# Patient Record
Sex: MALE | Race: WHITE | NOT HISPANIC OR LATINO | Employment: OTHER | URBAN - METROPOLITAN AREA
[De-identification: names, ages, dates, MRNs, and addresses within clinical notes are randomized per-mention and may not be internally consistent; named-entity substitution may affect disease eponyms.]

---

## 2017-03-21 ENCOUNTER — OFFICE VISIT - RIVER FALLS (OUTPATIENT)
Dept: FAMILY MEDICINE | Facility: CLINIC | Age: 67
End: 2017-03-21

## 2017-04-06 ENCOUNTER — OFFICE VISIT - RIVER FALLS (OUTPATIENT)
Dept: FAMILY MEDICINE | Facility: CLINIC | Age: 67
End: 2017-04-06

## 2017-04-06 ASSESSMENT — MIFFLIN-ST. JEOR: SCORE: 1685.25

## 2018-04-19 ENCOUNTER — AMBULATORY - RIVER FALLS (OUTPATIENT)
Dept: FAMILY MEDICINE | Facility: CLINIC | Age: 68
End: 2018-04-19

## 2018-04-20 LAB
CHOLEST SERPL-MCNC: 166 MG/DL
CHOLEST/HDLC SERPL: 3.8 {RATIO}
CREAT SERPL-MCNC: 0.94 MG/DL (ref 0.7–1.25)
GLUCOSE BLD-MCNC: 89 MG/DL (ref 65–99)
HDLC SERPL-MCNC: 44 MG/DL
LDLC SERPL CALC-MCNC: 98 MG/DL
NONHDLC SERPL-MCNC: 122 MG/DL
PSA SERPL-MCNC: 0.3 NG/ML
TRIGL SERPL-MCNC: 143 MG/DL

## 2018-05-04 ENCOUNTER — OFFICE VISIT - RIVER FALLS (OUTPATIENT)
Dept: FAMILY MEDICINE | Facility: CLINIC | Age: 68
End: 2018-05-04

## 2018-05-04 ASSESSMENT — MIFFLIN-ST. JEOR: SCORE: 1739.68

## 2018-06-19 ENCOUNTER — OFFICE VISIT - RIVER FALLS (OUTPATIENT)
Dept: FAMILY MEDICINE | Facility: CLINIC | Age: 68
End: 2018-06-19

## 2018-06-19 ASSESSMENT — MIFFLIN-ST. JEOR: SCORE: 1739.68

## 2019-01-04 ENCOUNTER — OFFICE VISIT - RIVER FALLS (OUTPATIENT)
Dept: FAMILY MEDICINE | Facility: CLINIC | Age: 69
End: 2019-01-04

## 2019-01-04 ASSESSMENT — MIFFLIN-ST. JEOR: SCORE: 1730.61

## 2019-05-14 ENCOUNTER — AMBULATORY - RIVER FALLS (OUTPATIENT)
Dept: FAMILY MEDICINE | Facility: CLINIC | Age: 69
End: 2019-05-14

## 2019-05-15 ENCOUNTER — COMMUNICATION - RIVER FALLS (OUTPATIENT)
Dept: FAMILY MEDICINE | Facility: CLINIC | Age: 69
End: 2019-05-15

## 2019-05-15 LAB
BUN SERPL-MCNC: 17 MG/DL (ref 7–25)
BUN/CREAT RATIO - HISTORICAL: NORMAL (ref 6–22)
CALCIUM SERPL-MCNC: 9.2 MG/DL (ref 8.6–10.3)
CHLORIDE BLD-SCNC: 102 MMOL/L (ref 98–110)
CHOLEST SERPL-MCNC: 158 MG/DL
CHOLEST/HDLC SERPL: 3.3 {RATIO}
CO2 SERPL-SCNC: 27 MMOL/L (ref 20–32)
CREAT SERPL-MCNC: 0.93 MG/DL (ref 0.7–1.25)
EGFRCR SERPLBLD CKD-EPI 2021: 85 ML/MIN/1.73M2
GLUCOSE BLD-MCNC: 88 MG/DL (ref 65–99)
HDLC SERPL-MCNC: 48 MG/DL
LDLC SERPL CALC-MCNC: 89 MG/DL
NONHDLC SERPL-MCNC: 110 MG/DL
POTASSIUM BLD-SCNC: 4.1 MMOL/L (ref 3.5–5.3)
PSA SERPL-MCNC: 0.4 NG/ML
SODIUM SERPL-SCNC: 139 MMOL/L (ref 135–146)
TRIGL SERPL-MCNC: 113 MG/DL

## 2019-05-17 ENCOUNTER — OFFICE VISIT - RIVER FALLS (OUTPATIENT)
Dept: FAMILY MEDICINE | Facility: CLINIC | Age: 69
End: 2019-05-17

## 2019-10-22 ENCOUNTER — OFFICE VISIT - RIVER FALLS (OUTPATIENT)
Dept: FAMILY MEDICINE | Facility: CLINIC | Age: 69
End: 2019-10-22

## 2019-10-22 ASSESSMENT — MIFFLIN-ST. JEOR: SCORE: 1721.54

## 2020-05-04 ENCOUNTER — COMMUNICATION - RIVER FALLS (OUTPATIENT)
Dept: FAMILY MEDICINE | Facility: CLINIC | Age: 70
End: 2020-05-04

## 2020-07-22 ENCOUNTER — COMMUNICATION - RIVER FALLS (OUTPATIENT)
Dept: FAMILY MEDICINE | Facility: CLINIC | Age: 70
End: 2020-07-22

## 2020-08-13 ENCOUNTER — OFFICE VISIT - RIVER FALLS (OUTPATIENT)
Dept: FAMILY MEDICINE | Facility: CLINIC | Age: 70
End: 2020-08-13

## 2020-08-13 ENCOUNTER — TRANSFERRED RECORDS (OUTPATIENT)
Dept: MULTI SPECIALTY CLINIC | Facility: CLINIC | Age: 70
End: 2020-08-13

## 2020-08-13 ASSESSMENT — MIFFLIN-ST. JEOR: SCORE: 1730.61

## 2020-08-14 ENCOUNTER — COMMUNICATION - RIVER FALLS (OUTPATIENT)
Dept: FAMILY MEDICINE | Facility: CLINIC | Age: 70
End: 2020-08-14

## 2020-08-14 LAB
BUN SERPL-MCNC: 13 MG/DL (ref 7–25)
BUN/CREAT RATIO - HISTORICAL: ABNORMAL (ref 6–22)
CALCIUM SERPL-MCNC: 9 MG/DL (ref 8.6–10.3)
CHLORIDE BLD-SCNC: 103 MMOL/L (ref 98–110)
CHOLEST SERPL-MCNC: 157 MG/DL
CHOLEST/HDLC SERPL: 4.1 {RATIO}
CO2 SERPL-SCNC: 28 MMOL/L (ref 20–32)
CREAT SERPL-MCNC: 0.98 MG/DL (ref 0.7–1.25)
EGFRCR SERPLBLD CKD-EPI 2021: 79 ML/MIN/1.73M2
GLUCOSE BLD-MCNC: 101 MG/DL (ref 65–99)
HDLC SERPL-MCNC: 38 MG/DL
LDLC SERPL CALC-MCNC: 82 MG/DL
NONHDLC SERPL-MCNC: 119 MG/DL
POTASSIUM BLD-SCNC: 4.4 MMOL/L (ref 3.5–5.3)
PSA SERPL-MCNC: 0.3 NG/ML
SODIUM SERPL-SCNC: 139 MMOL/L (ref 135–146)
TRIGL SERPL-MCNC: 283 MG/DL

## 2020-11-12 ENCOUNTER — COMMUNICATION - RIVER FALLS (OUTPATIENT)
Dept: FAMILY MEDICINE | Facility: CLINIC | Age: 70
End: 2020-11-12

## 2021-08-19 ENCOUNTER — AMBULATORY - RIVER FALLS (OUTPATIENT)
Dept: FAMILY MEDICINE | Facility: CLINIC | Age: 71
End: 2021-08-19

## 2021-08-20 ENCOUNTER — COMMUNICATION - RIVER FALLS (OUTPATIENT)
Dept: FAMILY MEDICINE | Facility: CLINIC | Age: 71
End: 2021-08-20

## 2021-08-20 LAB
BUN SERPL-MCNC: 19 MG/DL (ref 7–25)
BUN/CREAT RATIO - HISTORICAL: NORMAL (ref 6–22)
CALCIUM SERPL-MCNC: 8.7 MG/DL (ref 8.6–10.3)
CHLORIDE BLD-SCNC: 107 MMOL/L (ref 98–110)
CHOLEST SERPL-MCNC: 154 MG/DL
CHOLEST/HDLC SERPL: 3 {RATIO}
CO2 SERPL-SCNC: 25 MMOL/L (ref 20–32)
CREAT SERPL-MCNC: 0.96 MG/DL (ref 0.7–1.25)
EGFRCR SERPLBLD CKD-EPI 2021: 80 ML/MIN/1.73M2
GLUCOSE BLD-MCNC: 89 MG/DL (ref 65–99)
HDLC SERPL-MCNC: 52 MG/DL
LDLC SERPL CALC-MCNC: 86 MG/DL
NONHDLC SERPL-MCNC: 102 MG/DL
POTASSIUM BLD-SCNC: 4.2 MMOL/L (ref 3.5–5.3)
PSA SERPL-MCNC: 0.3 NG/ML
SODIUM SERPL-SCNC: 141 MMOL/L (ref 135–146)
TRIGL SERPL-MCNC: 70 MG/DL

## 2021-08-26 ENCOUNTER — OFFICE VISIT - RIVER FALLS (OUTPATIENT)
Dept: FAMILY MEDICINE | Facility: CLINIC | Age: 71
End: 2021-08-26

## 2021-10-07 ENCOUNTER — AMBULATORY - RIVER FALLS (OUTPATIENT)
Dept: FAMILY MEDICINE | Facility: CLINIC | Age: 71
End: 2021-10-07

## 2022-01-11 ENCOUNTER — OFFICE VISIT - RIVER FALLS (OUTPATIENT)
Dept: FAMILY MEDICINE | Facility: CLINIC | Age: 72
End: 2022-01-11

## 2022-02-11 VITALS
BODY MASS INDEX: 29.12 KG/M2 | SYSTOLIC BLOOD PRESSURE: 142 MMHG | DIASTOLIC BLOOD PRESSURE: 74 MMHG | HEART RATE: 75 BPM | TEMPERATURE: 97.8 F | WEIGHT: 208.8 LBS | DIASTOLIC BLOOD PRESSURE: 92 MMHG | HEART RATE: 82 BPM | SYSTOLIC BLOOD PRESSURE: 137 MMHG

## 2022-02-11 VITALS
DIASTOLIC BLOOD PRESSURE: 64 MMHG | TEMPERATURE: 97.8 F | HEART RATE: 92 BPM | SYSTOLIC BLOOD PRESSURE: 124 MMHG | OXYGEN SATURATION: 98 % | BODY MASS INDEX: 28.98 KG/M2 | WEIGHT: 207.8 LBS

## 2022-02-11 VITALS
DIASTOLIC BLOOD PRESSURE: 80 MMHG | HEART RATE: 75 BPM | HEIGHT: 71 IN | WEIGHT: 198 LBS | SYSTOLIC BLOOD PRESSURE: 114 MMHG | SYSTOLIC BLOOD PRESSURE: 134 MMHG | DIASTOLIC BLOOD PRESSURE: 72 MMHG | BODY MASS INDEX: 27.72 KG/M2

## 2022-02-11 VITALS
SYSTOLIC BLOOD PRESSURE: 126 MMHG | DIASTOLIC BLOOD PRESSURE: 68 MMHG | WEIGHT: 210 LBS | WEIGHT: 210 LBS | BODY MASS INDEX: 29.4 KG/M2 | HEART RATE: 76 BPM | HEIGHT: 71 IN | BODY MASS INDEX: 29.4 KG/M2 | HEART RATE: 84 BPM | SYSTOLIC BLOOD PRESSURE: 128 MMHG | HEIGHT: 71 IN | DIASTOLIC BLOOD PRESSURE: 68 MMHG

## 2022-02-11 VITALS
HEIGHT: 71 IN | OXYGEN SATURATION: 97 % | BODY MASS INDEX: 29.12 KG/M2 | SYSTOLIC BLOOD PRESSURE: 124 MMHG | TEMPERATURE: 98.4 F | WEIGHT: 208 LBS | DIASTOLIC BLOOD PRESSURE: 66 MMHG | HEART RATE: 88 BPM

## 2022-02-11 VITALS
BODY MASS INDEX: 28.84 KG/M2 | TEMPERATURE: 97.8 F | HEART RATE: 60 BPM | RESPIRATION RATE: 16 BRPM | WEIGHT: 206 LBS | HEIGHT: 71 IN | DIASTOLIC BLOOD PRESSURE: 76 MMHG | SYSTOLIC BLOOD PRESSURE: 126 MMHG

## 2022-02-11 VITALS — SYSTOLIC BLOOD PRESSURE: 118 MMHG | DIASTOLIC BLOOD PRESSURE: 82 MMHG

## 2022-02-11 VITALS
DIASTOLIC BLOOD PRESSURE: 68 MMHG | WEIGHT: 208 LBS | HEART RATE: 86 BPM | SYSTOLIC BLOOD PRESSURE: 146 MMHG | BODY MASS INDEX: 29.12 KG/M2 | HEIGHT: 71 IN

## 2022-02-15 NOTE — LETTER
(Inserted Image. Unable to display)   May 15, 2019      SHANE MCKEON      [address] [place] [address]        Dear SHANE,    Thank you for selecting [facility] (previously [facility]) for your healthcare needs.  Below you will find the results of your recent tests done at our clinic.    Results are acceptable.      Result Name Current Result Previous Result Reference Range   Sodium Level (mmol/L)  139 5/14/2019  140 4/19/2018 135 - 146   Potassium Level (mmol/L)  4.1 5/14/2019  4.2 4/19/2018 3.5 - 5.3   Chloride Level (mmol/L)  102 5/14/2019  104 4/19/2018 98 - 110   CO2 Level (mmol/L)  27 5/14/2019  28 4/19/2018 20 - 32   Glucose Level (mg/dL)  88 5/14/2019  89 4/19/2018 65 - 99   BUN (mg/dL)  17 5/14/2019  18 4/19/2018 7 - 25   Creatinine Level (mg/dL)  0.93 5/14/2019  0.94 4/19/2018 0.70 - 1.25   eGFR (mL/min/1.73m2)  85 5/14/2019  85 4/19/2018 > OR = 60 -    Calcium Level (mg/dL)  9.2 5/14/2019  8.9 4/19/2018 8.6 - 10.3   Cholesterol (mg/dL)  158 5/14/2019  166 4/19/2018  - <200   Non-HDL Cholesterol  110 5/14/2019  122 4/19/2018  - <130   HDL (mg/dL)  48 5/14/2019  44 4/19/2018 >40 -    Cholesterol/HDL Ratio  3.3 5/14/2019  3.8 4/19/2018  - <5.0   LDL  89 5/14/2019  98 4/19/2018    Triglyceride (mg/dL)  113 5/14/2019  143 4/19/2018  - <150   PSA (ng/mL)  0.4 5/14/2019  0.3 4/19/2018  - < OR = 4.0       Please contact me or my assistant at [phone] if you have any questions.     Sincerely,        Alexi Troy MD

## 2022-02-15 NOTE — NURSING NOTE
Medicare Visit Entered On:  8/13/2020 3:59 PM CDT    Performed On:  8/13/2020 3:52 PM CDT by Toña Muhammad               Summary   Chief Complaint :   AWV.   Weight Measured :   208 lb(Converted to: 208 lb 0 oz, 94.35 kg)    Height Measured :   71 in(Converted to: 5 ft 11 in, 180.34 cm)    Body Mass Index :   29.01 kg/m2 (HI)    Body Surface Area :   2.17 m2   Systolic Blood Pressure :   124 mmHg   Diastolic Blood Pressure :   66 mmHg   Mean Arterial Pressure :   85 mmHg   Peripheral Pulse Rate :   88 bpm   BP Site :   Right arm   BP Method :   Manual   HR Method :   Electronic   Temperature Tympanic :   98.4 DegF(Converted to: 36.9 DegC)    Oxygen Saturation :   97 %   Toña Muhammad - 8/13/2020 3:52 PM CDT   Health Status   Allergies Verified? :   Yes   Medication History Verified? :   Yes   Medical History Verified? :   Yes   Pre-Visit Planning Status :   Completed   Tobacco Use? :   Never smoker   Toña Muhammad - 8/13/2020 3:52 PM CDT   Consents   Consent for Immunization Exchange :   Consent Granted   Consent for Immunizations to Providers :   Consent Granted   Toña Muhammad - 8/13/2020 3:52 PM CDT   Meds / Allergies   (As Of: 8/13/2020 3:59:59 PM CDT)   Allergies (Active)   brimonidine ophthalmic  Estimated Onset Date:   Unspecified ; Reactions:   rash ; Created By:   Alexi Troy MD; Reaction Status:   Active ; Category:   Drug ; Substance:   brimonidine ophthalmic ; Type:   Allergy ; Updated By:   Alexi Troy MD; Reviewed Date:   8/13/2020 3:58 PM CDT        Medication List   (As Of: 8/13/2020 3:59:59 PM CDT)   Prescription/Discharge Order    atorvastatin  :   atorvastatin ; Status:   Prescribed ; Ordered As Mnemonic:   atorvastatin 20 mg oral tablet ; Simple Display Line:   20 mg, 1 tab(s), po, daily, 90 tab(s), 0 Refill(s) ; Ordering Provider:   Alexi Troy MD; Catalog Code:   atorvastatin ; Order Dt/Tm:   5/4/2020 10:38:20 AM CDT          hydroCHLOROthiazide  :    hydroCHLOROthiazide ; Status:   Prescribed ; Ordered As Mnemonic:   hydroCHLOROthiazide 12.5 mg oral capsule ; Simple Display Line:   12.5 mg, 1 cap(s), PO, Daily, 90 cap(s), 0 Refill(s) ; Ordering Provider:   Alexi Troy MD; Catalog Code:   hydroCHLOROthiazide ; Order Dt/Tm:   5/4/2020 10:38:02 AM CDT          lisinopril  :   lisinopril ; Status:   Prescribed ; Ordered As Mnemonic:   lisinopril 40 mg oral tablet ; Simple Display Line:   40 mg, 1 tab(s), PO, Daily, 90 tab(s), 0 Refill(s) ; Ordering Provider:   Alexi Troy MD; Catalog Code:   lisinopril ; Order Dt/Tm:   5/4/2020 10:35:23 AM CDT          fluticasone nasal  :   fluticasone nasal ; Status:   Prescribed ; Ordered As Mnemonic:   Flonase 50 mcg/inh nasal spray ; Simple Display Line:   1 spray(s), nasal, daily, 3 EA, 3 Refill(s) ; Ordering Provider:   Alexi Troy MD; Catalog Code:   fluticasone nasal ; Order Dt/Tm:   5/17/2019 4:07:24 PM CDT            Geriatric Depression Screening   Geriatric Depression Satisfied Life :   Yes   Geriatric Depression Dropped Activities :   No   Geriatric Depression Life Empty :   No   Geriatric Depression Bored :   No   Geriatric Depression Good Spirits :   Yes   Geriatric Depression Afraid Bad Things :   No   Geriatric Depression Feel Happy :   Yes   Geriatric Depression Feel Helpless :   No   Geriatric Depression Prefer to Stay Home :   Yes   Geriatric Depression Memory Problems :   No   Geriatric Depression Wonderful Be Alive :   Yes   Geriatric Depression Feel Worthless :   No   Geriatric Depression Situation Hopeless :   No   Geriatric Depression Others Better Off :   No   Geriatric Depression Full of Energy :   Yes   Geriatric Depression Total Score :   1    Toña Muhammad M - 8/13/2020 3:52 PM CDT   Hearing and Vision Screening   Audiogram Result Right Ear :   Fail   Audiogram Result Left Ear :   Fail   Hearing Screen Comments :   Missed 500, 2000, 4000 Hz R ear   Missed 500 Hz L ear    Toña Muhammad  M - 8/13/2020 3:52 PM CDT   Home Safety Screen   Emergency Numbers Kept/Updated :   Yes   Aware of Smoking Dangers :   Yes   Smoke Alarms/Fire Extinguisher Available :   Yes   Household Members Fire Safety Knowledge :   Yes   Firearms Unloaded and Secure :   Yes   Floor Rugs Removed or Fastened :   Yes   Mats in Bathtub/Shower :   Yes   Stairway Rails or Banisters :   Yes   Outdoor Clutter Safety :   Yes   Indoor Clutter Safety :   Yes   Electrical Cord Safety :   Yes   Toña Muhammad M - 8/13/2020 3:52 PM CDT   Advance Directives   Advance Directive :   No   Toña Muhammad M - 8/13/2020 3:52 PM CDT   [place] Fall Risk   History of Fall in Last 3 Months [place] :   No   Toña Muhammad M - 8/13/2020 3:52 PM CDT   Functional Assessment   Focused Functional Assessment Grid   Bathing :   Independent (2)   Dressing :   Independent (2)   Toileting :   Independent (2)   Transferring Bed or Chair :   Independent (2)   Continence :   Independent (2)   Feeding :   Independent (2)   Toña Muhammad M - 8/13/2020 3:52 PM CDT   ADL Index Score :   12    Capable of Shopping :   Yes   Capable of Walking :   Yes   Capable of Housekeeping :   Yes   Capable of Managing Medications :   Yes   Capable of Handling Finances :   Yes   Toña Muhammad M - 8/13/2020 3:52 PM CDT

## 2022-02-15 NOTE — LETTER
(Inserted Image. Unable to display)       [address] [place] [address]  August 20, 2021      SHANE [address] [place] [address]        Dear SHANE,    Thank you for selecting [facility] for your healthcare needs.  Below you will find the results of your recent tests done at our clinic.     Excellent reuslts      Result Name Current Result Previous Result Reference Range   Sodium Level (mmol/L)  141 8/19/2021  139 8/13/2020 135 - 146   Potassium Level (mmol/L)  4.2 8/19/2021  4.4 8/13/2020 3.5 - 5.3   Chloride Level (mmol/L)  107 8/19/2021  103 8/13/2020 98 - 110   CO2 Level (mmol/L)  25 8/19/2021  28 8/13/2020 20 - 32   Glucose Level (mg/dL)  89 8/19/2021 ((H)) 101 8/13/2020 65 - 99   BUN (mg/dL)  19 8/19/2021  13 8/13/2020 7 - 25   Creatinine Level (mg/dL)  0.96 8/19/2021  0.98 8/13/2020 0.70 - 1.25   eGFR (mL/min/1.73m2)  80 8/19/2021  79 8/13/2020 > OR = 60 -    Calcium Level (mg/dL)  8.7 8/19/2021  9.0 8/13/2020 8.6 - 10.3   Cholesterol (mg/dL)  154 8/19/2021  157 8/13/2020  - <200   Non-HDL Cholesterol  102 8/19/2021  119 8/13/2020  - <130   HDL (mg/dL)  52 8/19/2021 ((L)) 38 8/13/2020 > OR = 40 -    Cholesterol/HDL Ratio  3.0 8/19/2021  4.1 8/13/2020  - <5.0   LDL  86 8/19/2021  82 8/13/2020    Triglyceride (mg/dL)  70 8/19/2021 ((H)) 283 8/13/2020  - <150   PSA (ng/mL)  0.3 8/19/2021  0.3 8/13/2020  - < OR = 4.0       Please contact me or my assistant at [phone] if you have any questions.     Sincerely,        Alexi Troy MD

## 2022-02-15 NOTE — TELEPHONE ENCOUNTER
---------------------  From: Olinda Acharya CMA (Phone Messages Pool ([id number]))   To: [employer] Message Pool ([facility]24[facility]);     Sent: 7/22/2020 9:30:32 AM CDT  Subject: FW: Re-sched.           ---------------------  From: SHANE MCKEON  [facility]  Sent: 07/22/2020 09:15 a.m. CDT  Subject: Re-sched.  I donated blood on July 10. A sample was checked for virus antibodies. The result was negative.    Armando---------------------  From: Toña Muhammad ([employer] Message Pool ([id number]))   To: Alexi Troy MD;     Sent: 7/22/2020 1:04:46 PM CDT  Subject: FW: Re-sched.---------------------  From: Alexi Troy MD   To: [employer] Message Pool ([id number] [facility] [place]);     Sent: 7/22/2020 1:07:54 PM CDT  Subject: RE: Re-sched.     ok

## 2022-02-15 NOTE — PROGRESS NOTES
Chief Complaint    bp check  History of Present Illness      Patient here for follow-up of blood pressure checked since lisinopril was increased.  He feels well.  Review of Systems      No cough, dyspnea, headache, chest pain, dyspnea, edema.  Physical Exam   Vitals & Measurements    HR: 84(Peripheral)  BP: 126/68       Patient is alert and oriented he appears comfortable in no distress.  Assessment/Plan       HTN, goal below 140/90(I10)        Controlled.         Orders:          42955 office outpatient visit 15 minutes (Charge), Quantity: 1, HTN, goal below 140/90          Return to Clinic (Request), RFV: BP check with CSS, Return in 2-4 weeks  Patient Information     Name:SHANE MCKEON      Address:      [address] [place] [address]      Sex:Male      YOB: 1950      [phone]      Emergency Contact:LIDA WILLIAMSON     MRN:[id number]     FIN:[id number]     Location:[facility]     Date of Service:06/19/2018      Primary Care Physician:       Alexi Troy MD, [phone]      Attending Physician:       Alexi Troy MD, [phone]  Problem List/Past Medical History    Ongoing     Chronic low back pain     Chronic rhinitis     GERD (gastroesophageal reflux disease)     Glaucoma     H/O adenomatous polyp of colon     Hearing disorder     HTN, goal below 140/90     Hyperlipidemia       Comments: Goal <130     Metabolic syndrome     T7-T12 level spinal cord injury       Comments: Due to a fall    Historical     No qualifying data  Procedure/Surgical History     Colonoscopy (03/18/2015)     Flexible sigmoidoscopy (11/18/2008)     Spermatocelectomy (2001)     Compression fracture  Medications        Lumigan: both eyes, qpm, 0 Refill(s).        Azopt 1% ophthalmic suspension: 1 drop(s), both eyes, tid, 0 Refill(s).        atorvastatin 20 mg oral tablet: 20 mg, 1 tab(s), po, daily, 90 tab(s), 3 Refill(s).        Flonase 50 mcg/inh nasal spray: 1 spray(s), nasal, daily, 3 EA, 3  Refill(s).        hydroCHLOROthiazide 12.5 mg oral capsule: 12.5 mg, 1 cap(s), PO, Daily, 90 cap(s), 3 Refill(s).        lisinopril 40 mg oral tablet: 40 mg, 1 tab(s), PO, Daily, 90 tab(s), 3 Refill(s).                Allergies    brimonidine ophthalmic (rash)  Social History    Smoking Status - 04/06/2017     Never smoker     Alcohol - Current, 03/25/2010      Wine (5 oz), 2-4 times/week, 1 drinks/episode average. 2 drinks/episode maximum., 08/29/2011     Employment and Education      Employed, Work/School description: Bio. research. Highest education level: Post graduate degree(s)., 05/04/2018     Exercise and Physical Activity      Exercise type: agricultural work, field research., 08/29/2011     Home and Environment      Marital status:  (Living together). Spouse/Partner name: Noemy Cotto., 05/02/2017     Substance Abuse - Denies Substance Abuse, 05/04/2018      Never, 05/04/2018     Tobacco - Denies Tobacco Use, 03/25/2010      No tobacco use., 08/29/2011  Family History    CVA - Cerebrovascular accident: Mother.    Hypertension: Mother.    Miscellaneous: Mother.    Parkinson's disease: Father.    Daughter: History is negative    Brother: History is negative    Sister: History is negative    Brother: History is negative    Brother: History is negative  Immunizations      Vaccine Date Status      influenza 01/12/2018 Recorded      tetanus/diphth/pertuss (Tdap) adult/adol 10/01/2016 Recorded      influenza virus vaccine, inactivated 10/01/2016 Recorded      tetanus/diphth/pertuss (Tdap) adult/adol 08/23/2016 Recorded      influenza virus vaccine, inactivated 10/25/2013 Recorded      influenza 09/16/2011 Given      Td 11/21/1995 Recorded  Lab Results          Lab Results (Last 4 results within 90 days)           Sodium Level: 140 mmol/L [135 mmol/L - 146 mmol/L] (04/19/18 08:27:00)          Potassium Level: 4.2 mmol/L [3.5 mmol/L - 5.3 mmol/L] (04/19/18 08:27:00)          Chloride Level: 104 mmol/L [98  mmol/L - 110 mmol/L] (04/19/18 08:27:00)          CO2 Level: 28 mmol/L [20 mmol/L - 31 mmol/L] (04/19/18 08:27:00)          Glucose Level: 89 mg/dL [65 mg/dL - 99 mg/dL] (04/19/18 08:27:00)          BUN: 18 mg/dL [7 mg/dL - 25 mg/dL] (04/19/18 08:27:00)          Creatinine Level: 0.94 mg/dL [0.7 mg/dL - 1.25 mg/dL] (04/19/18 08:27:00)          BUN/Creat Ratio: NOT APPLICABLE [6  - 22] (04/19/18 08:27:00)          eGFR: 85 mL/min/1.73m2 [8.6 mg/dL - 10.3 mg/dL] (04/19/18 08:27:00)          eGFR African American: 98 mL/min/1.73m2 [6  - 22] (04/19/18 08:27:00)          Calcium Level: 8.9 mg/dL [8.6 mg/dL - 10.3 mg/dL] (04/19/18 08:27:00)          Cholesterol: 166 mg/dL [8.6 mg/dL - 10.3 mg/dL] (04/19/18 08:27:00)          Non-HDL Cholesterol: 122 [20 mmol/L - 31 mmol/L] (04/19/18 08:27:00)          HDL: 44 mg/dL [65 mg/dL - 99 mg/dL] (04/19/18 08:27:00)          Cholesterol/HDL Ratio: 3.8 [0.7 mg/dL - 1.25 mg/dL] (04/19/18 08:27:00)          LDL: 98 [0.7 mg/dL - 1.25 mg/dL] (04/19/18 08:27:00)          Triglyceride: 143 mg/dL [8.6 mg/dL - 10.3 mg/dL] (04/19/18 08:27:00)          PSA: 0.3 ng/mL [3.5 mmol/L - 5.3 mmol/L] (04/19/18 08:27:00)

## 2022-02-15 NOTE — LETTER
(Inserted Image. Unable to display)   August 14, 2020      [place]RA[place] [address]  [place] [id number]        Dear SHANE,    Thank you for selecting [facility] ([facility]viously [facility] for your healthcare needs.  Below you will find the results of your recent tests done at our clinic.     Glucose and triglyceride elevated. Recommend weight loss, regular physical activity, and a low carbohydrate diet. Repeat lab work in one year.      Result Name Current Result Previous Result Reference Range   Sodium Level (mmol/L)  139 8/13/2020  139 5/14/2019 135 - 146   Potassium Level (mmol/L)  4.4 8/13/2020  4.1 5/14/2019 3.5 - 5.3   Chloride Level (mmol/L)  103 8/13/2020  102 5/14/2019 98 - 110   CO2 Level (mmol/L)  28 8/13/2020  27 5/14/2019 20 - 32   Glucose Level (mg/dL) ((H)) 101 8/13/2020  88 5/14/2019 65 - 99   BUN (mg/dL)  13 8/13/2020  17 5/14/2019 7 - 25   Creatinine Level (mg/dL)  0.98 8/13/2020  0.93 5/14/2019 0.70 - 1.25   eGFR (mL/min/1.73m2)  79 8/13/2020  85 5/14/2019 > OR = 60 -    Calcium Level (mg/dL)  9.0 8/13/2020  9.2 5/14/2019 8.6 - 10.3   Cholesterol (mg/dL)  157 8/13/2020  158 5/14/2019  - <200   Non-HDL Cholesterol  119 8/13/2020  110 5/14/2019  - <130   HDL (mg/dL) ((L)) 38 8/13/2020  48 5/14/2019 > OR = 40 -    Cholesterol/HDL Ratio  4.1 8/13/2020  3.3 5/14/2019  - <5.0   LDL  82 8/13/2020  89 5/14/2019    Triglyceride (mg/dL) ((H)) 283 8/13/2020  113 5/14/2019  - <150   PSA (ng/mL)  0.3 8/13/2020  0.4 5/14/2019  - < OR = 4.0       Please contact me or my assistant at [phone] if you have any questions.     Sincerely,        Alexi Troy MD

## 2022-02-15 NOTE — NURSING NOTE
Comprehensive Intake Entered On:  8/26/2021 11:01 AM CDT    Performed On:  8/26/2021 10:56 AM CDT by Toña Muhammad               Summary   Chief Complaint :   Patient is here today for annual px.    Advance Directive :   No   Weight Measured :   208.8 lb(Converted to: 208 lb 13 oz, 94.710 kg)    Systolic Blood Pressure :   143 mmHg (HI)    Diastolic Blood Pressure :   86 mmHg (HI)    Mean Arterial Pressure :   105 mmHg   Peripheral Pulse Rate :   82 bpm   BP Site :   Right arm   BP Method :   Electronic   HR Method :   Electronic   Temperature Tympanic :   97.8 DegF(Converted to: 36.6 DegC)  (LOW)    Toña Muhammad - 8/26/2021 10:56 AM CDT   Health Status   Allergies Verified? :   Yes   Medication History Verified? :   Yes   Medical History Verified? :   Yes   Pre-Visit Planning Status :   Completed   Tobacco Use? :   Never smoker   Toña Muhammad - 8/26/2021 10:56 AM CDT   Consents   Consent for Immunization Exchange :   Consent Granted   Consent for Immunizations to Providers :   Consent Granted   Toña Muhammad - 8/26/2021 10:56 AM CDT   Meds / Allergies   (As Of: 8/26/2021 11:01:04 AM CDT)   Allergies (Active)   brimonidine ophthalmic  Estimated Onset Date:   Unspecified ; Reactions:   rash ; Created By:   Alexi Troy MD; Reaction Status:   Active ; Category:   Drug ; Substance:   brimonidine ophthalmic ; Type:   Allergy ; Updated By:   Alexi Troy MD; Reviewed Date:   8/26/2021 11:00 AM CDT        Medication List   (As Of: 8/26/2021 11:01:04 AM CDT)   Prescription/Discharge Order    lisinopril  :   lisinopril ; Status:   Prescribed ; Ordered As Mnemonic:   lisinopril 40 mg oral tablet ; Simple Display Line:   1 tab(s), Oral, daily, 30 tab(s), 0 Refill(s) ; Ordering Provider:   Alexi Troy MD; Catalog Code:   lisinopril ; Order Dt/Tm:   8/13/2021 10:04:18 AM CDT          hydroCHLOROthiazide  :   hydroCHLOROthiazide ; Status:   Prescribed ; Ordered As Mnemonic:   hydroCHLOROthiazide  12.5 mg oral capsule ; Simple Display Line:   1 cap(s), Oral, daily, 30 tab(s), 0 Refill(s) ; Ordering Provider:   Alexi Troy MD; Catalog Code:   hydroCHLOROthiazide ; Order Dt/Tm:   8/13/2021 10:04:00 AM CDT          fluticasone nasal  :   fluticasone nasal ; Status:   Prescribed ; Ordered As Mnemonic:   Flonase 50 mcg/inh nasal spray ; Simple Display Line:   1 spray(s), nasal, daily, 3 EA, 3 Refill(s) ; Ordering Provider:   Alexi Troy MD; Catalog Code:   fluticasone nasal ; Order Dt/Tm:   5/17/2019 4:07:24 PM CDT          atorvastatin  :   atorvastatin ; Status:   Prescribed ; Ordered As Mnemonic:   atorvastatin 20 mg oral tablet ; Simple Display Line:   1 tab(s), Oral, daily, 30 tab(s), 0 Refill(s) ; Ordering Provider:   Alexi Troy MD; Catalog Code:   atorvastatin ; Order Dt/Tm:   8/13/2021 10:03:37 AM CDT            Social History   Social History   (As Of: 8/26/2021 11:01:04 AM CDT)   Alcohol:  Current      Wine (5 oz), 3-5 times per week, 1 drinks/episode average.  2 drinks/episode maximum.   (Last Updated: 8/17/2020 3:13:30 PM CDT by Nichole Su)          Tobacco:  Denies Tobacco Use      No tobacco use.   (Last Updated: 8/29/2011 10:06:24 AM CDT by Emma Mariano)   Never (less than 100 in lifetime)   (Last Updated: 8/26/2021 10:58:59 AM CDT by Toña Muhammad)          Electronic Cigarette/Vaping:        Electronic Cigarette Use: Never.   (Last Updated: 8/26/2021 10:59:11 AM CDT by Toña Muhammad)          Substance Abuse:  Denies Substance Abuse      Never   (Last Updated: 5/4/2018 11:38:58 AM CDT by Adelaida Asencio)          Employment/School:        Retired, Work/School description: Bio. research.  Highest education level: Post graduate degree(s).   (Last Updated: 8/17/2020 3:18:08 PM CDT by Nichole Su)          Home/Environment:        Marital status:  (Living together).  Spouse/Partner name: Noemy Cotto.  Living situation: Home/Independent.  Injuries/Abuse/Neglect in  household: No.  Feels unsafe at home: No.  Family/Friends available for support: Yes.   (Last Updated: 8/17/2020 3:16:34 PM CDT by Nichole Su)          Nutrition/Health:        Type of diet: Regular.  Sleeping concerns: No.  Feels highly stressed: No.   (Last Updated: 8/17/2020 3:14:20 PM CDT by Nichole Su)          Exercise:        Exercise frequency: 5-6 times/week.  Exercise type: Yard work, gardening, woodworking.   (Last Updated: 8/17/2020 3:17:47 PM CDT by Nichole Su)
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
## 2022-02-15 NOTE — LETTER
(Inserted Image. Unable to display)   October 25, 2019      SHANE FRAZIER[address]  [place] [id number]        Dear SHANE,      Thank you for selecting [facility] ([facility]viously [facility] for your healthcare needs.       The pathology report from the lesion we removed indicated that it was a squamous cell carcinoma.  A complete excision is necessary.  I will put in a referral to have you see dermatology.          Please contact me or my assistant at [phone] if you have any questions or concerns.     Sincerely,        Reji Redd PA-C

## 2022-02-15 NOTE — NURSING NOTE
"Comprehensive Intake Entered On:  10/22/2019 1:50 PM CDT    Performed On:  10/22/2019 1:39 PM CDT by Giovani Marcano CMA               Summary   Chief Complaint :   Pt here for lump on Right forearm that he has had \"for awhile\"   Weight Measured :   206 lb(Converted to: 206 lb 0 oz, 93.44 kg)    Height Measured :   71 in(Converted to: 5 ft 11 in, 180.34 cm)    Body Mass Index :   28.73 kg/m2 (HI)    Body Surface Area :   2.16 m2   Systolic Blood Pressure :   126 mmHg   Diastolic Blood Pressure :   76 mmHg   Mean Arterial Pressure :   93 mmHg   Peripheral Pulse Rate :   60 bpm   BP Site :   Right arm   Pulse Site :   Radial artery   Temperature Tympanic :   97.8 DegF(Converted to: 36.6 DegC)  (LOW)    Respiratory Rate :   16 br/min   Giovani Marcano CMA - 10/22/2019 1:39 PM CDT   Health Status   Allergies Verified? :   Yes   Medication History Verified? :   Yes   Medical History Verified? :   Yes   Pre-Visit Planning Status :   Not completed   Tobacco Use? :   Never smoker   Giovani Marcano CMA - 10/22/2019 1:39 PM CDT   Meds / Allergies   (As Of: 10/22/2019 1:50:51 PM CDT)   Allergies (Active)   brimonidine ophthalmic  Estimated Onset Date:   Unspecified ; Reactions:   rash ; Created By:   Alexi Troy MD; Reaction Status:   Active ; Category:   Drug ; Substance:   brimonidine ophthalmic ; Type:   Allergy ; Updated By:   Alexi Troy MD; Reviewed Date:   10/22/2019 1:49 PM CDT        Medication List   (As Of: 10/22/2019 1:50:51 PM CDT)   Prescription/Discharge Order    fluticasone nasal  :   fluticasone nasal ; Status:   Prescribed ; Ordered As Mnemonic:   Flonase 50 mcg/inh nasal spray ; Simple Display Line:   1 spray(s), nasal, daily, 3 EA, 3 Refill(s) ; Ordering Provider:   Alexi Troy MD; Catalog Code:   fluticasone nasal ; Order Dt/Tm:   5/17/2019 4:07:24 PM CDT          lisinopril  :   lisinopril ; Status:   Prescribed ; Ordered As Mnemonic:   lisinopril 40 mg oral tablet ; Simple Display Line:   40 mg, " 1 tab(s), PO, Daily, 90 tab(s), 3 Refill(s) ; Ordering Provider:   Alexi Troy MD; Catalog Code:   lisinopril ; Order Dt/Tm:   5/17/2019 4:07:01 PM CDT          hydroCHLOROthiazide  :   hydroCHLOROthiazide ; Status:   Prescribed ; Ordered As Mnemonic:   hydroCHLOROthiazide 12.5 mg oral capsule ; Simple Display Line:   12.5 mg, 1 cap(s), PO, Daily, 90 cap(s), 3 Refill(s) ; Ordering Provider:   Alexi Troy MD; Catalog Code:   hydroCHLOROthiazide ; Order Dt/Tm:   5/17/2019 4:06:59 PM CDT          atorvastatin  :   atorvastatin ; Status:   Prescribed ; Ordered As Mnemonic:   atorvastatin 20 mg oral tablet ; Simple Display Line:   20 mg, 1 tab(s), po, daily, 90 tab(s), 3 Refill(s) ; Ordering Provider:   Alexi Troy MD; Catalog Code:   atorvastatin ; Order Dt/Tm:   5/17/2019 4:06:52 PM CDT            Social History   Social History   (As Of: 10/22/2019 1:50:51 PM CDT)   Alcohol:  Current      Wine (5 oz), 2-4 times/week, 1 drinks/episode average.  2 drinks/episode maximum.   (Last Updated: 8/29/2011 10:34:35 AM CDT by Emma Mariano)          Tobacco:  Denies Tobacco Use      No tobacco use.   (Last Updated: 8/29/2011 10:06:24 AM CDT by Emma Mariano)          Substance Abuse:  Denies Substance Abuse      Never   (Last Updated: 5/4/2018 11:38:58 AM CDT by Adelaida Asencio)          Employment/School:        Employed, Work/School description: Bio. research.  Highest education level: Post graduate degree(s).   (Last Updated: 5/4/2018 11:39:56 AM CDT by Adelaida Asencio)          Home/Environment:        Marital status:  (Living together).  Spouse/Partner name: Noemy Cotto.   (Last Updated: 5/2/2017 3:01:10 PM CDT by Nichole Su)          Exercise:        Exercise type: agricultural work, field research.   (Last Updated: 8/29/2011 10:08:36 AM CDT by Emma Mariano)

## 2022-02-15 NOTE — TELEPHONE ENCOUNTER
Entered by Luisa Faust RN on September 29, 2020 8:46:32 AM CDT  ---------------------  From: Luisa Faust RN   To: [facility]    Sent: 9/29/2020 8:46:32 AM CDT  Subject: Medication Management     ** Not Approved: Filled 8/13/20 for 90 day supply **  atorvastatin (ATORVASTATIN TAB 20MG TABLET)  TAKE 1 TABLET BY MOUTH ONCE DAILY  Qty:  90 unknown unit        Days Supply:  0        Refills:  0          Substitutions Allowed     Route To Pharmacy - [facility]   Signed by Luisa Faust RN            ** Patient matched by Luisa Faust RN on 9/29/2020 8:45:34 AM CDT **      ------------------------------------------  From: Akros Silicon  To: Alexi Troy MD  Sent: September 26, 2020 11:21:54 AM CDT  Subject: Medication Management  Due: September 9, 2020 4:21:06 PM CDT     ** On Hold Pending Signature **     Dispensed Drug: atorvastatin (atorvastatin 20 mg oral tablet), TAKE 1 TABLET BY MOUTH ONCE DAILY  Quantity: 90 unknown unit  Days Supply: 0  Refills: 0  Substitutions Allowed  Notes from Pharmacy:  ------------------------------------------

## 2022-02-15 NOTE — LETTER
(Inserted Image. Unable to display)   August 02, 2021    SHANE MCKEON  [address] [place] [address] [place]  [place] [address]            Dear SHANE,      Thank you for selecting [facility] for your healthcare needs.    Our records indicate you are due for the following services:    Medicare Annual Wellness Visit.    Fasting Lab Tests ~ Please do not eat or drink anything 10 hours prior to your scheduled appointment time.  (Water and any medications that you may need are allowed unless directed otherwise.)    If you had your labs done at another facility or with Direct Access Lab Testing at [facility], please bring in a copy of the results to your next visit, mail a copy, or drop off a copy of your results to your Healthcare Provider.    (FYI   Regarding office visits: In some instances, a video visit or telephone visit may be offered as an option.)    You are due for lab work and an office visit; please schedule the lab appointment 1 week before the office visit.  This will assure all results are available to discuss with your Healthcare Provider during your visit.    **It is very helpful if you bring your medication bottles to your appointment.  This assures we have all of your current medications, including strength and dosing information, documented accurately in your medical record.    To schedule an appointment or if you have further questions, please contact your clinic at [phone].      Powered by [employer]    Sincerely,    Alexi Troy MD, FACP

## 2022-02-15 NOTE — LETTER
(Inserted Image. Unable to display)   September 10, 2021    SHANE FRAZIER[address]  [place] [id number]            Dear SHANE,      Thank you for selecting [facility] for your healthcare needs.    Our records indicate you are due for the following services:     Clinical Support Staff (CSS)-Only Blood Pressure Check ~ Please stop in anytime to have your blood pressure rechecked. This is a free service and no appointment necessary.     So we can best determine if your medications are effective in lowering your blood pressure, please make sure your blood pressure medicine has been in your system for at least 1-2 hours prior to coming in.  We encourage you to avoid caffeine or other stimulants prior to having your blood pressure checked and come at a time when you are not feeling rushed.     If you check your blood pressure at home, please bring in your blood pressure monitor and home blood pressure readings.  We will check your machine for accuracy and also share your home readings with your Healthcare Provider.     (FYI   Regarding office visits: In some instances, a video visit or telephone visit may be offered as an option.)      To schedule an appointment or if you have further questions, please contact your clinic at [phone].      Powered by [employer]    Sincerely,    Alexi Troy MD, FACP

## 2022-02-15 NOTE — TELEPHONE ENCOUNTER
Order, demographics, and note sent to [facility] per request, they will contact patient to arrange.

## 2022-02-15 NOTE — NURSING NOTE
Quick Intake Entered On:  5/17/2019 4:10 PM CDT    Performed On:  5/17/2019 4:08 PM CDT by Alexi Troy MD               Summary   Systolic Blood Pressure :   124 mmHg   Diastolic Blood Pressure :   64 mmHg   Mean Arterial Pressure :   84 mmHg   BP Site :   Right lower leg   BP Method :   Electronic   Alexi Troy MD - 5/17/2019 4:08 PM CDT
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
## 2022-02-15 NOTE — TELEPHONE ENCOUNTER
Entered by Dotty Avila CMA on November 12, 2020 7:26:51 AM CST  ---------------------  From: Dotty Avila CMA   To: [facility]    Sent: 11/12/2020 7:26:51 AM CST  Subject: Medication Management     ** Submitted: **  Order:lisinopril (lisinopril 40 mg oral tablet)  1 tab(s)  Oral  daily  Qty:  90 tab(s)        Refills:  2          Substitutions Allowed     Route To Pharmacy - [facility]    Signed by Dotty Avila CMA  11/12/2020 1:26:00 PM UT    ** Submitted: **  Complete:lisinopril (lisinopril 40 mg oral tablet)   Signed by Dotty Avila CMA  11/12/2020 1:26:00 PM UT    ** Not Approved:  **  lisinopril (LISINOPRIL 40MG TABLET)  TAKE ONE TABLET BY MOUTH ONCE DAILY  Qty:  90 EA        Days Supply:  90        Refills:  0          Substitutions Allowed     Route To Pharmacy - [facility]   Signed by Dotty Avila CMA            ** Submitted: **  Order:hydroCHLOROthiazide (hydroCHLOROthiazide 12.5 mg oral capsule)  1 cap(s)  Oral  daily  Qty:  90 cap(s)        Refills:  2          Substitutions Allowed     Route To Pharmacy - [facility]    Signed by Dotty Avila CMA  11/12/2020 1:26:00 PM UT    ** Submitted: **  Complete:hydroCHLOROthiazide (hydroCHLOROthiazide 12.5 mg oral capsule)   Signed by Dotty Avila CMA  11/12/2020 1:26:00 PM UT    ** Not Approved:  **  hydroCHLOROthiazide (HYDROCHLOROTHIAZIDE 12.5MG CAPSULE)  TAKE ONE CAPSULE BY MOUTH ONCE DAILY  Qty:  90 EA        Days Supply:  90        Refills:  0          Substitutions Allowed     Route To Pharmacy - [facility]   Signed by Dotty Avila CMA            ** Submitted: **  Order:atorvastatin (atorvastatin 20 mg oral tablet)  1 tab(s)  Oral  daily  Qty:  90 tab(s)        Refills:  2          Substitutions Allowed     Route To Pharmacy - [facility]    Signed by Dotty Avila CMA  11/12/2020 1:25:00 PM UT    ** Submitted: **  Complete:atorvastatin (atorvastatin 20 mg oral tablet)   Signed by Dotty Avila CMA  11/12/2020 1:25:00 PM [facility]    ** Not  Approved:  **  atorvastatin (ATORVASTATIN CALCIUM 20MG TABLET)  TAKE ONE TABLET BY MOUTH ONCE DAILY  Qty:  90 EA        Days Supply:  90        Refills:  0          Substitutions Allowed     Route To Pharmacy - [facility]   Signed by Dotty Avila CMA            ** Patient matched by Dotty Avila CMA on 11/12/2020 7:23:15 AM CST **      ------------------------------------------  From: [facility]  To: Alexi Troy MD  Sent: November 11, 2020 11:59:03 AM CST  Subject: Medication Management  Due: November 7, 2020 12:21:51 AM CST     ** On Hold Pending Signature **     Drug: lisinopril (lisinopril 40 mg oral tablet), TAKE ONE TABLET BY MOUTH ONCE DAILY  Quantity: 90 EA  Days Supply: 90  Refills: 0  Substitutions Allowed  Notes from Pharmacy:     Dispensed Drug: lisinopril (lisinopril 40 mg oral tablet), TAKE ONE TABLET BY MOUTH ONCE DAILY  Quantity: 90 EA  Days Supply: 90  Refills: 0  Substitutions Allowed  Notes from Pharmacy:     ** On Hold Pending Signature **     Drug: atorvastatin (atorvastatin 20 mg oral tablet), TAKE ONE TABLET BY MOUTH ONCE DAILY  Quantity: 90 EA  Days Supply: 90  Refills: 0  Substitutions Allowed  Notes from Pharmacy:     Dispensed Drug: atorvastatin (atorvastatin 20 mg oral tablet), TAKE ONE TABLET BY MOUTH ONCE DAILY  Quantity: 90 EA  Days Supply: 90  Refills: 0  Substitutions Allowed  Notes from Pharmacy:     ** On Hold Pending Signature **     Drug: hydroCHLOROthiazide (hydroCHLOROthiazide 12.5 mg oral capsule), TAKE ONE CAPSULE BY MOUTH ONCE DAILY  Quantity: 90 EA  Days Supply: 90  Refills: 0  Substitutions Allowed  Notes from Pharmacy:     Dispensed Drug: hydroCHLOROthiazide (hydroCHLOROthiazide 12.5 mg oral capsule), TAKE ONE CAPSULE BY MOUTH ONCE DAILY  Quantity: 90 EA  Days Supply: 90  Refills: 0  Substitutions Allowed  Notes from Pharmacy:  ------------------------------------------8/13/20 px, labs  RTC August 2021

## 2022-02-15 NOTE — PROGRESS NOTES
Patient:   SHANE MCKEON            MRN: [id number]            FIN: [id number]               Age:   70 years     Sex:  Male     :  1950   Associated Diagnoses:   Chronic rhinitis; H/O adenomatous polyp of colon; HTN, goal below 140/90; Hyperlipidemia; Wellness examination   Author:   Alexi Troy MD      Visit Information   Visit type:  Annual exam.    Accompanied by:  No one.    Source of history:  Self.    History limitation:  None.       Chief Complaint   2020 3:52 PM CDT    AWV.      Well Adult History    The patient is a semiretired 70-year-old here for a wellness visit.  He has been less physically active and a little more indulgent with his diet and has gained some weight.  He has a history of dyslipidemia, chronic rhinitis, adenomatous colonic polyps, and hypertension.      On complete review of systems he notes chronic bilateral tinnitus that he only notes in very quiet environments, and nasal congestion chronically.  Complete review of systems otherwise negative.       GDS (short form) score is 0.       No significant abnormalities on the Health Risk Assessment form which is reviewed with the patient.       Health History is also reviewed with the patient.           Review of Systems   See HPI       Health Status   Allergies:    Allergic Reactions (Selected)  Severity Not Documented  Brimonidine ophthalmic (Rash)   Medications:  (Selected)   Prescriptions  Prescribed  Flonase 50 mcg/inh nasal spray: = 1 spray(s), nasal, daily, # 3 EA, 3 Refill(s), Type: Maintenance  atorvastatin 20 mg oral tablet: = 1 tab(s) ( 20 mg ), po, daily, # 90 tab(s), 0 Refill(s), Type: Maintenance, Pharmacy: [facility], pt is due for annual visit and labs, 1 tab(s) Oral daily, 71, in, 20 15:52:00 CDT, Height Measured, 208, lb, 20 15:52:00 CDT, Weight Leonard...  hydroCHLOROthiazide 12.5 mg oral capsule: = 1 cap(s) ( 12.5 mg ), PO, Daily, # 90 cap(s), 0 Refill(s), Type: Maintenance, Pharmacy: [facility], 1 cap(s) Oral daily, 71, in, 20 15:52:00 CDT, Height Measured, 208, lb, 20 15:52:00 CDT, Weight Measured  lisinopril 40 mg oral tablet: = 1 tab(s) ( 40 mg ), PO, Daily, # 90 tab(s), 0 Refill(s), Type: Maintenance, Pharmacy: [facility], 1 tab(s) Oral daily, 71, in, 20 15:52:00 CDT, Height Measured, 208, lb, 20 15:52:00 CDT, Weight Measured   Problem list:    All Problems  Chronic low back pain / SNOMED CT 420180176 / Confirmed  Chronic rhinitis / SNOMED CT 503408247 / Confirmed  GERD (gastroesophageal reflux disease) / SNOMED CT 1340093834 / Confirmed  Glaucoma / SNOMED CT 64581100 / Confirmed  H/O adenomatous polyp of colon / SNOMED CT 3558358766 / Confirmed  Hearing disorder / SNOMED CT 4682253929 / Confirmed  HTN, goal below 140/90 / SNOMED CT 74749207 / Confirmed  Hyperlipidemia / SNOMED CT 201736985 / Confirmed  SCC (squamous cell carcinoma), arm / SNOMED CT 1182018705 / Confirmed  Inactive: Metabolic syndrome / SNOMED CT 2022060556  Inactive: T7-T12 level spinal cord injury / SNOMED CT 14195612      Histories   Past Medical History:    Active  HTN, goal below 140/90 (09003216): Onset on 2013 at 62 years.  Chronic rhinitis (028186816): Onset on 2010 at 59 years.  Comments:  2010 CDT 3:12 PM CDT -     Chronic low back pain (852272967): Onset on 2010 at 59 years.  Comments:  2010 CDT 3:31 PM CDT -     Glaucoma (99774127)  GERD (gastroesophageal reflux disease) (2994839381)  Hearing disorder (8826845199)   Family History:    Hypertension  Mother ()  Parkinson's disease  Father  CVA - Cerebrovascular accident  Mother ()  Miscellaneous  Mother ()  Comments:  2010 10:31 AM CDT - Viry Ceballos CMA  Chronic boughts of diarrhea     Procedure history:    Colonoscopy (254760685) on 3/18/2015 at 64 Years.  Comments:  3/19/2015 5:43 PM CDT - Alexi Troy MD  Indication: Screening  Sedation: 150 mcg Fentanyl, 3 mg Versed  Findings: 2  Adenomatous polyps  Repeat in 5 years.  Flexible sigmoidoscopy (59877089) on 11/18/2008 at 58 Years.  Comments:  4/1/2013 8:20 AM CDT - Woodrow TOPETE, Alexi  Normal. Repeat in 5 years.  Spermatocelectomy (89114246) in 2001 at 51 Years.  Comments:  8/29/2011 10:17 AM CDT - Emma Mariano  Left side.    8/29/2011 10:16 AM CDT - Emma Mariano  And hydrocelectomy.  Compression fracture (829.0).  Comments:  8/29/2011 10:31 AM CDT - Emma Mariano  T7, due to fall.   Social History:        Alcohol Assessment: Current            Wine (5 oz), 2-4 times/week, 1 drinks/episode average.  2 drinks/episode maximum.      Tobacco Assessment: Denies Tobacco Use            No tobacco use.      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  Highest education level: Post graduate degree(s).      Home and Environment Assessment            Marital status:  (Living together).  Spouse/Partner name: Noemy Cotto.      Exercise and Physical Activity Assessment            Exercise type: agricultural work, field research.  ,        Alcohol Assessment: Current            Wine (5 oz), 2-4 times/week, 1 drinks/episode average.  2 drinks/episode maximum.      Tobacco Assessment: Denies Tobacco Use            No tobacco use.      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  Highest education level: Post graduate degree(s).      Home and Environment Assessment            Marital status:  (Living together).  Spouse/Partner name: Noemy Cotto.      Exercise and Physical Activity Assessment            Exercise type: agricultural work, field research.        Physical Examination   Vital Signs   8/13/2020 3:52 PM CDT Temperature Tympanic 98.4 DegF    Peripheral Pulse Rate 88 bpm    HR Method  Electronic    Systolic Blood Pressure 124 mmHg    Diastolic Blood Pressure 66 mmHg    Mean Arterial Pressure 85 mmHg    BP Site Right arm    BP Method Manual    Oxygen Saturation 97 %      Measurements from flowsheet : Measurements   8/13/2020 3:52 PM CDT Height Measured - Standard 71 in    Weight Measured - Standard 208 lb    BSA 2.17 m2    Body Mass Index 29.01 kg/m2  HI      General:  Alert and oriented, No acute distress.    Eye:  Normal conjunctiva.    Airway:       Mallampati classification: I (soft palate, fauces, uvula, pillars visible).    HENT:  Normocephalic, Normal hearing, Oral mucosa is moist, No pharyngeal erythema.    Neck:  Supple, Non-tender, No carotid bruit, No lymphadenopathy, No thyromegaly.    Respiratory:  Lungs are clear to auscultation, Respirations are non-labored.    Cardiovascular:  Normal rate, Regular rhythm, No murmur, No gallop, Good pulses equal in all extremities, Normal peripheral perfusion, No edema.    Gastrointestinal:  Soft, Non-tender, Non-distended, No organomegaly.    Genitourinary:       Prostate: Declined after a discussion.    Lymphatics:  No lymphadenopathy neck, axilla, groin.    Musculoskeletal:  No deformity, Normal gait.    Integumentary:  No pallor.    Neurologic:  No focal deficits.    Cognition and Speech:  Speech clear and coherent, Functional cognition intact.    Psychiatric:  Cooperative, Appropriate mood & affect.       Impression and Plan   Diagnosis     Chronic rhinitis (ZPM05-KT J31.0).     H/O adenomatous polyp of colon (UKO50-ES Z86.010).     HTN, goal below 140/90 (PKL12-EE I10).     Hyperlipidemia (BFP87-AE E78.2).     Wellness examination (QKL05-LU Z00.00).     Course:  Progressing as expected.    Patient Instructions:       Counseled: Patient, Verbalized understanding, exercise and weight loss.    Orders     Orders (Selected)   Outpatient Orders  Ordered  Return to Clinic (Request): RFV: Annual Px, Return in 08/01/2021  Return to Office (Request):  RFV: Colonoscopy in 5 years  Return to Office (Request): RFV: Lipids, BMP and PSA annually  Ordered (Dispatched)  Basic Metabolic Panel* ([facility]): Specimen Type: Serum, Collection Date: 08/13/20 16:12:00 CDT  Lipid panel with reflex to direct ldl* ([facility]): Specimen Type: Serum, Collection Date: 08/13/20 16:12:00 CDT  PSA, Total (Room)* ([facility]): Specimen Type: Serum, Collection Date: 08/13/20 16:12:00 CDT  Prescriptions  Prescribed  Flonase 50 mcg/inh nasal spray: = 1 spray(s), nasal, daily, # 3 EA, 3 Refill(s), Type: Maintenance  atorvastatin 20 mg oral tablet: = 1 tab(s) ( 20 mg ), po, daily, # 90 tab(s), 0 Refill(s), Type: Maintenance, Pharmacy: [facility], pt is due for annual visit and labs, 1 tab(s) Oral daily, 71, in, 08/13/20 15:52:00 CDT, Height Measured, 208, lb, 08/13/20 15:52:00 CDT, Weight Leonard...  hydroCHLOROthiazide 12.5 mg oral capsule: = 1 cap(s) ( 12.5 mg ), PO, Daily, # 90 cap(s), 0 Refill(s), Type: Maintenance, Pharmacy: [facility], 1 cap(s) Oral daily, 71, in, 08/13/20 15:52:00 CDT, Height Measured, 208, lb, 08/13/20 15:52:00 CDT, Weight Measured  lisinopril 40 mg oral tablet: = 1 tab(s) ( 40 mg ), PO, Daily, # 90 tab(s), 0 Refill(s), Type: Maintenance, Pharmacy: [facility], 1 tab(s) Oral daily, 71, in, 08/13/20 15:52:00 CDT, Height Measured, 208, lb, 08/13/20 15:52:00 CDT, Weight Measured.     Pneumonia vaccination, Shingrix.     Colonoscopy.

## 2022-02-15 NOTE — PROGRESS NOTES
Chief Complaint    Patient is here today for annual px.  History of Present Illness       Patient with history of hypertension, dyslipidemia, chronic rhinitis, chronic polyps here for refill of his medications.  He has been well.  He is not immunized for Covid and agrees to immunization today.  He has some problems with osteoarthritis particularly in the right hand which he injured many years ago.  Does not take any over-the-counter medications.  Review of Systems       No fever, chills, headache, myalgia, chest pain, dyspnea or edema.  No pedal complaints.  Physical Exam   Vitals & Measurements    T: 97.8  F (Tympanic)  HR: 82 (Peripheral)  BP: 142/92     WT: 208.8 lb        Patient appears comfortable and in no distress.  Alert and oriented.  In good spirits.  Chest clear.  Cardiac exam regular.  No edema.  Right hand appears normal.  No tenderness.  Strength and sensation intact in the right hand.  Assessment/Plan       Chronic rhinitis (J31.0)          Controlled         Ordered:          85446 office o/p est mod 30-39 min (Charge), Quantity: 1, H/O adenomatous polyp of colon                H/O adenomatous polyp of colon (V12.72)          Repeat colonoscopy in 2023.                HTN, goal below 140/90 (I10)          Not controlled by today's reading, however the patient tells me when he donates blood he is readings are less than 130/80.  Patient return for recheck of his blood pressure.         Ordered:          33694 office o/p est mod 30-39 min (Charge), Quantity: 1, H/O adenomatous polyp of colon                Hyperlipidemia (E78.2)          Well-controlled.         Ordered:          68053 office o/p est mod 30-39 min (Charge), Quantity: 1, H/O adenomatous polyp of colon                Orders:         atorvastatin, = 1 tab(s), Oral, daily, # 90 tab(s), 3 Refill(s), Type: Maintenance, Pharmacy: [facility], 1 tab(s) Oral daily, 71, in, 08/13/20 15:52:00 CDT, Height Measured, 208.8, lb, 08/26/21 10:56:00  CDT, Weight Measured, (Ordered)         hydroCHLOROthiazide, = 1 cap(s), Oral, daily, # 90 cap(s), 3 Refill(s), Type: Maintenance, Pharmacy: [facility], 1 cap(s) Oral daily,x90 day(s), 71, in, 08/13/20 15:52:00 CDT, Height Measured, 208.8, lb, 08/26/21 10:56:00 CDT, Weight Measured, (Ordered)         lisinopril, = 1 tab(s), Oral, daily, # 90 tab(s), 3 Refill(s), Type: Maintenance, Pharmacy: [facility], 1 tab(s) Oral daily, 71, in, 08/13/20 15:52:00 CDT, Height Measured, 208.8, lb, 08/26/21 10:56:00 CDT, Weight Measured, (Ordered)         Return to Clinic (Request), RFV: BP check with CSS         Return to Clinic (Request), RFV: Annual Px, Return in one year         Return to Office (Request), RFV: Lipids, BMP and PSA annually         Return to Office (Request), RFV: Colonoscopy in 2023  Patient Information     Name:SHANE MCKEON      Address:      [address] [place] [id number]     Sex:Male     YOB: 1950     [phone]     Emergency Contact:LIDA WILLIAMSON     MRN:[id number]     FIN:[id number]     Location:[facility]     Date of Service:08/26/2021      Primary Care Physician:       Alexi Troy MD, [phone]      Attending Physician:       Alexi Troy MD, [phone]  Problem List/Past Medical History    Ongoing     Chronic low back pain     Chronic rhinitis     GERD (gastroesophageal reflux disease)     Glaucoma     H/O adenomatous polyp of colon     Hearing disorder     HTN, goal below 140/90     Hyperlipidemia       Comments: Goal <130     Metabolic syndrome     SCC (squamous cell carcinoma), arm     T7-T12 level spinal cord injury       Comments: Due to a fall    Historical     No qualifying data  Procedure/Surgical History     Colonoscopy (08/27/2020)      Comments: Indication: History of colonic polyps.      Sedation: MAC.      Impression: One 5 mm polyp at hepatic flexure.  Two 2 mm polyps in transverse colon.  One 3 mm polyp in descending colon.   One 6 mm polyp in rectum..     Colonoscopy (03/18/2015)      Comments: Indication: Screening      Sedation: 150 mcg Fentanyl, 3 mg Versed      Findings: 2 Adenomatous polyps      Repeat in 5 years..     Flexible sigmoidoscopy (11/18/2008)      Comments: Normal. Repeat in 5 years..     Spermatocelectomy (2001)      Comments: Left side.. And hydrocelectomy..     Compression fracture      Comments: T7, due to fall..  Medications    atorvastatin 20 mg oral tablet, 1 tab(s), Oral, daily, 3 refills    Flonase 50 mcg/inh nasal spray, 1 spray(s), Nasal, daily, 3 refills    hydroCHLOROthiazide 12.5 mg oral capsule, 1 cap(s), Oral, daily, 3 refills    lisinopril 40 mg oral tablet, 1 tab(s), Oral, daily, 3 refills  Allergies    brimonidine ophthalmic (rash)  Social History    Smoking Status     Never smoker     Alcohol - Current      Wine (5 oz), 3-5 times per week, 1 drinks/episode average. 2 drinks/episode maximum.     Electronic Cigarette/Vaping      Electronic Cigarette Use: Never.     Employment/School      Retired, Work/School description: Bio. research. Highest education level: Post graduate degree(s).     Exercise      Exercise frequency: 5-6 times/week. Exercise type: Yard work, gardening, woodworking.     Home/Environment      Marital status:  (Living together). Spouse/Partner name: Noemy Cotto. Living situation: Home/Independent. Injuries/Abuse/Neglect in household: No. Feels unsafe at home: No. Family/Friends available for support: Yes.     Nutrition/Health      Type of diet: Regular. Sleeping concerns: No. Feels highly stressed: No.     Substance Abuse - Denies Substance Abuse      Never     Tobacco - Denies Tobacco Use      Never (less than 100 in lifetime)  Family History    CVA - Cerebrovascular accident: Mother.    Cancer: Grandmother (M) and Grandmother (P).    Hypertension: Mother.    Miscellaneous: Mother.    Parkinson's disease: Father.    Sister: History is negative    Brother: History is  negative    Brother: History is negative    Brother: History is negative    Daughter: History is negative  Lab Results          Lab Results (Last 4 results within 90 days)           Sodium Level: 141 mmol/L [135 mmol/L - 146 mmol/L] (08/19/21 08:13:00)          Potassium Level: 4.2 mmol/L [3.5 mmol/L - 5.3 mmol/L] (08/19/21 08:13:00)          Chloride Level: 107 mmol/L [98 mmol/L - 110 mmol/L] (08/19/21 08:13:00)          CO2 Level: 25 mmol/L [20 mmol/L - 32 mmol/L] (08/19/21 08:13:00)          Glucose Level: 89 mg/dL [65 mg/dL - 99 mg/dL] (08/19/21 08:13:00)          BUN: 19 mg/dL [7 mg/dL - 25 mg/dL] (08/19/21 08:13:00)          Creatinine Level: 0.96 mg/dL [0.7 mg/dL - 1.25 mg/dL] (08/19/21 08:13:00)          BUN/Creat Ratio: NOT APPLICABLE [6  - 22] (08/19/21 08:13:00)          eGFR: 80 mL/min/1.73m2 (08/19/21 08:13:00)          eGFR : 93 mL/min/1.73m2 (08/19/21 08:13:00)          Calcium Level: 8.7 mg/dL [8.6 mg/dL - 10.3 mg/dL] (08/19/21 08:13:00)          Cholesterol: 154 mg/dL (08/19/21 08:13:00)          Non-HDL Cholesterol: 102 (08/19/21 08:13:00)          HDL: 52 mg/dL (08/19/21 08:13:00)          Cholesterol/HDL Ratio: 3 (08/19/21 08:13:00)          LDL: 86 (08/19/21 08:13:00)          Triglyceride: 70 mg/dL (08/19/21 08:13:00)          PSA: 0.3 ng/mL (08/19/21 08:13:00)  Immunizations          Scheduled Immunizations          Dose Date(s)          influenza virus vaccine, inactivated          10/25/2013, 10/01/2016, 12/09/2018          tetanus/diphth/pertuss (Tdap) adult/adol          10/01/2016, 08/23/2016          Other Immunizations          influenza          09/16/2011, 01/12/2018          influenza virus vaccine, inactivated          10/22/2019          pneumococcal (PPSV23)          08/13/2020          Td          11/21/1995          pneumococcal (PCV13)          05/17/2019

## 2022-02-15 NOTE — NURSING NOTE
Quick Intake Entered On:  8/26/2021 11:11 AM CDT    Performed On:  8/26/2021 11:11 AM CDT by Alexi Troy MD               Summary   Advance Directive :   No   Systolic Blood Pressure :   142 mmHg (HI)    Diastolic Blood Pressure :   92 mmHg (HI)    Mean Arterial Pressure :   109 mmHg   BP Site :   Right arm   BP Method :   Manual   Alexi Troy MD - 8/26/2021 11:11 AM CDT

## 2022-02-15 NOTE — NURSING NOTE
Comprehensive Intake Entered On:  5/17/2019 4:00 PM CDT    Performed On:  5/17/2019 3:54 PM CDT by Cecilia Oconnell CMA               Summary   Chief Complaint :   Patient presents today to f/u on labs and rx refills.    Weight Measured :   207.8 lb(Converted to: 207 lb 13 oz, 94.26 kg)    Systolic Blood Pressure :   132 mmHg (HI)    Diastolic Blood Pressure :   70 mmHg   Mean Arterial Pressure :   91 mmHg   Peripheral Pulse Rate :   92 bpm   BP Site :   Right arm   BP Method :   Manual   HR Method :   Electronic   Temperature Tympanic :   97.8 DegF(Converted to: 36.6 DegC)  (LOW)    Oxygen Saturation :   98 %   Cecilia Oconnell CMA - 5/17/2019 3:54 PM CDT   Health Status   Allergies Verified? :   Yes   Medication History Verified? :   Yes   Pre-Visit Planning Status :   N/A   Tobacco Use? :   Never smoker   Cecilia Oconnell CMA - 5/17/2019 3:54 PM CDT   Consents   Consent for Immunization Exchange :   Consent Granted   Consent for Immunizations to Providers :   Consent Granted   Cecilia Oconnell CMA 5/17/2019 3:54 PM CDT   Meds / Allergies   (As Of: 5/17/2019 4:00:18 PM CDT)   Allergies (Active)   brimonidine ophthalmic  Estimated Onset Date:   Unspecified ; Reactions:   rash ; Created By:   Alexi Troy MD; Reaction Status:   Active ; Category:   Drug ; Substance:   brimonidine ophthalmic ; Type:   Allergy ; Updated By:   Alexi Troy MD; Reviewed Date:   2/16/2016 2:10 PM CST        Medication List   (As Of: 5/17/2019 4:00:18 PM CDT)   Prescription/Discharge Order    atorvastatin  :   atorvastatin ; Status:   Prescribed ; Ordered As Mnemonic:   atorvastatin 20 mg oral tablet ; Simple Display Line:   20 mg, 1 tab(s), po, daily, 90 tab(s), 3 Refill(s) ; Ordering Provider:   Alexi Troy MD; Catalog Code:   atorvastatin ; Order Dt/Tm:   5/4/2018 9:27:24 AM          fluticasone nasal  :   fluticasone nasal ; Status:   Prescribed ; Ordered As Mnemonic:   Flonase 50 mcg/inh nasal spray ; Simple Display Line:   1 spray(s),  nasal, daily, 3 EA, 3 Refill(s) ; Ordering Provider:   Alexi Troy MD; Catalog Code:   fluticasone nasal ; Order Dt/Tm:   5/4/2018 9:27:41 AM          hydroCHLOROthiazide  :   hydroCHLOROthiazide ; Status:   Prescribed ; Ordered As Mnemonic:   hydroCHLOROthiazide 12.5 mg oral capsule ; Simple Display Line:   12.5 mg, 1 cap(s), PO, Daily, 90 cap(s), 3 Refill(s) ; Ordering Provider:   Alexi Troy MD; Catalog Code:   hydroCHLOROthiazide ; Order Dt/Tm:   5/4/2018 9:29:18 AM          lisinopril  :   lisinopril ; Status:   Prescribed ; Ordered As Mnemonic:   lisinopril 40 mg oral tablet ; Simple Display Line:   40 mg, 1 tab(s), PO, Daily, 90 tab(s), 3 Refill(s) ; Ordering Provider:   Alexi Troy MD; Catalog Code:   lisinopril ; Order Dt/Tm:   5/4/2018 9:28:30 AM            Home Meds    bimatoprost ophthalmic  :   bimatoprost ophthalmic ; Status:   Processing ; Ordered As Mnemonic:   Lumigan ; Action Display:   Complete ; Catalog Code:   bimatoprost ophthalmic ; Order Dt/Tm:   5/17/2019 3:58:13 PM          brinzolamide ophthalmic  :   brinzolamide ophthalmic ; Status:   Processing ; Ordered As Mnemonic:   Azopt 1% ophthalmic suspension ; Action Display:   Complete ; Catalog Code:   brinzolamide ophthalmic ; Order Dt/Tm:   5/17/2019 3:58:13 PM

## 2022-02-15 NOTE — LETTER
(Inserted Image. Unable to display)     May 06, 2019      SHANE MCKEON  [address] [place] [address]          Dear SHANE,      Thank you for selecting [facility] (previously [facility]) for your healthcare needs.    Our records indicate you are due for the following services:    Annual Wellness Visit.    Fasting Lab Tests ~ Please do not eat or drink anything 10 hours prior to your scheduled appointment time.  (Water and any medications that you may need are allowed unless directed otherwise.)    If you had your labs done at another facility or with Direct Access Lab Testing at [facility], please bring in a copy of the results to your next visit, mail a copy, or drop off a copy of your results to your Healthcare Provider.    You are due for lab work and an office visit; please schedule the lab appointment 1 week before the office visit.  This will assure all results are available to discuss with your provider during your visit.    **It is very helpful if you bring your medication bottles to your appointment.  This assures we have all of your current medications, including strength and dosing information, documented accurately in your medical record.    To schedule an appointment or if you have further questions, please contact your primary clinic:   [facility]       [phone]   [facility]       [phone]              [facility]     [phone]      Powered by [employer]    Sincerely,    Alexi Troy MD, FACP

## 2022-02-15 NOTE — TELEPHONE ENCOUNTER
---------------------  From: Toña Muhammad (Phone Messages Pool ([id number]))   Sent: 10/21/2019 10:39:55 AM CDT  Subject: Growth on arm      Phone Message    PCP: LUZ     Time of Call: 1025    Phone Number: [phone]    Returned call at: 1035    Note: Patient LM stating that he has a small growth on his forearm would like looked at. Patient wondering if he should be seen.     Called patient @ 1035. Patient stated that the growth has been there for a while and patient would like to get it looked at. Patient aware [facility] is out of clinic but was willing to schedule with a different provider for the time being. Patient wants to bee seen this week.      Last office visit and reason: 5/17/19 Annual Exam    Transferred to: Transferred to scheduling.

## 2022-02-15 NOTE — PROGRESS NOTES
Patient:   SHANE MCKEON            MRN: [id number]            FIN: [id number]               Age:   66 years     Sex:  Male     :  1950   Associated Diagnoses:   CHRONIC RHINITIS; HTN, goal below 140/90; H/O adenomatous polyp of colon; Hyperlipidemia; Wellness examination   Author:   Alexi Troy MD      Visit Information   Visit type:  Annual exam.    Accompanied by:  No one.    Source of history:  Self.    History limitation:  None.       Chief Complaint   2017 1:47 PM CDT     physical      Well Adult History   The patient has been well.  A fifth grandchild is on the way.  He has hypertension  and dyslipidemia.  He had a laceration to his finger repaired, but that is the only  thing that has happened in the past year.            Review of Systems   He wears glasses.  The review is otherwise negative.    PHQ-9 score:  0.      He drinks 2-3 times per week, 1 drink per time.            Health Status   Allergies:    Allergic Reactions (Selected)  Severity Not Documented  Brimonidine ophthalmic (Rash)   Medications:  (Selected)   Prescriptions  Prescribed  Flonase 50 mcg/inh nasal spray: 1 spray(s), nasal, daily, # 3 EA, 3 Refill(s), Type: Maintenance, Pharmacy: [id number], 1 spray(s) nasal daily  atorvastatin 20 mg oral tablet: See Instructions, Instructions: TAKE ONE TABLET BY MOUTH ONE TIME DAILY, # 90 tab(s), 3 Refill(s), Type: Soft Stop, Pharmacy: [id number], TAKE ONE TABLET BY MOUTH ONE TIME DAILY  hydroCHLOROthiazide-lisinopril 12.5 mg-20 mg oral tablet: 2 tab(s), PO, Daily, # 180 tab(s), 3 Refill(s), Type: Maintenance, Pharmacy: [id number], 2 tab(s) po daily,x90 day(s)  Documented Medications  Documented  Azopt 1% ophthalmic suspension: 1 drop(s), both eyes, tid, 0 Refill(s), Type: Maintenance  Lumigan: both eyes, qpm, 0 Refill(s), Type: Maintenance   Problem list:    All Problems  Chronic LBP / ICD-9-.2 / Confirmed  CHRONIC RHINITIS / ICD-9-.0 /  Confirmed  HTN, goal below 140/90 / SNOMED CT 12387604 / Confirmed  GERD (Gastroesophageal Reflux Disease) / ICD-9-.81 / Confirmed  Glaucoma / ICD-9-.9 / Confirmed  Hearing Disorder / ICD-9-.40 / Confirmed  H/O adenomatous polyp of colon / SNOMED CT 3112097993 / Confirmed  Hyperlipidemia / SNOMED CT 080797595 / Confirmed  Inactive: Metabolic Syndrome / ICD-9-.7  Inactive: T7-T12 Level Spinal Cord Injury / ICD-9-.15      Histories   Past Medical History:    Active  HTN, goal below 140/90 (29957572): Onset on 2013 at 62 years.  CHRONIC RHINITIS (472.0): Onset on 2010 at 59 years.  Comments:  2010 CDT 3:12 PM CDT -     Chronic LBP (724.2): Onset on 2010 at 59 years.  Comments:  2010 CDT 3:31 PM CDT -     Glaucoma (365.9)  GERD (Gastroesophageal Reflux Disease) (530.81)  Hearing Disorder (388.40)   Family History:    Hypertension  Mother ()  Miscellaneous  Mother ()  Comments:  2010 10:31 AM - Viry Ceballos CMA  Chronic boughts of diarrhea     Procedure history:    Colonoscopy (272872585) on 3/18/2015 at 64 Years.  Comments:  3/19/2015 5:43 PM - Alexi Troy MD  Indication: Screening  Sedation: 150 mcg Fentanyl, 3 mg Versed  Findings: 2 Adenomatous polyps  Repeat in 5 years.  Flexible sigmoidoscopy (60767724) on 2008 at 58 Years.  Comments:  2013 8:20 AM - Alexi Troy MD  Normal. Repeat in 5 years.  Spermatocelectomy (71666193) in  at 51 Years.  Comments:  2011 10:17 AM - Emma Mariano  Left side.    2011 10:16 AM - Emma Mariano  And hydrocelectomy.  Compression fracture (829.0).  Comments:  2011 10:31 AM - Emma Mariano  T7, due to fall.   Social History:        Alcohol Assessment: Current            Wine (5 oz), 2-4 times/week, 1 drinks/episode average.  2 drinks/episode maximum.      Tobacco Assessment: Denies Tobacco Use            No tobacco use.      Employment and Education Assessment             Employed, Work/School description: [employer].      Home and Environment Assessment            Marital status:  (Living together).      Exercise and Physical Activity Assessment            Exercise type: agricultural work, field research.  ,        Alcohol Assessment: Current            Wine (5 oz), 2-4 times/week, 1 drinks/episode average.  2 drinks/episode maximum.      Tobacco Assessment: Denies Tobacco Use            No tobacco use.      Employment and Education Assessment            Employed, Work/School description: [employer].      Home and Environment Assessment            Marital status:  (Living together).      Exercise and Physical Activity Assessment            Exercise type: agricultural work, field research.        Physical Examination   Vital Signs   4/6/2017 1:47 PM CDT Peripheral Pulse Rate 75 bpm    Systolic Blood Pressure 134 mmHg    Diastolic Blood Pressure 80 mmHg    Mean Arterial Pressure 98 mmHg    BP Site Right arm      Measurements from flowsheet : Measurements   4/6/2017 1:47 PM CDT Height Measured - Standard 71 in    Weight Measured - Standard 198 lb    BSA 2.12 m2    Body Mass Index 27.61 kg/m2      General:  Alert and oriented, No acute distress.    Eye:  Normal conjunctiva.    Airway:       Mallampati classification: I (soft palate, fauces, uvula, pillars visible).    HENT:  Normocephalic, Normal hearing, Oral mucosa is moist, No pharyngeal erythema.    Neck:  Supple, Non-tender, No carotid bruit, No lymphadenopathy, No thyromegaly.    Respiratory:  Lungs are clear to auscultation, Respirations are non-labored.    Cardiovascular:  Normal rate, Regular rhythm, No murmur, No gallop, Good pulses equal in all extremities, Normal peripheral perfusion, No edema.    Gastrointestinal:  Soft, Non-tender, Non-distended, Normal bowel sounds, No organomegaly.    Genitourinary:       Prostate: Symmetric, Enlarged  2 /4, Not nodular.    Musculoskeletal:  No deformity, Normal gait.     Integumentary:  No pallor.    Neurologic:  Normal sensory, Normal motor function, No focal deficits, Cranial Nerves II-XII are grossly intact.    Cognition and Speech:  Speech clear and coherent, Functional cognition intact.    Psychiatric:  Cooperative, Appropriate mood & affect.       Review / Management   Results review:  Lab results   3/21/2017 8:22 AM CDT Sodium Level 141 mmol/L    Potassium Level 4.2 mmol/L    Chloride Level 104 mmol/L    CO2 Level 27 mmol/L    Glucose Level 103 mg/dL  HI    BUN 24 mg/dL    Creatinine Level 1.07 mg/dL    BUN/Creat Ratio NOT APPLICABLE    eGFR 73 mL/min/1.73m2    eGFR African American 85 mL/min/1.73m2    Calcium Level 9.1 mg/dL    PSA 0.3 ng/mL   5/23/2016 7:53 AM CDT ALT/SGPT 22 unit/L    Cholesterol 153 mg/dL    Non-HDL Cholesterol 111    HDL 42 mg/dL    Cholesterol/HDL Ratio 3.6    LDL 92    Triglyceride 94 mg/dL   .       Impression and Plan   Diagnosis     CHRONIC RHINITIS (WEO70-VK J31.0).     HTN, goal below 140/90 (SCP90-XS I10).     H/O adenomatous polyp of colon (LVR89-QG Z86.010).     Hyperlipidemia (BUZ36-HQ E78.2).     Wellness examination (WAC08-HY Z01.89).     Course:  Progressing as expected.    Patient Instructions:       Counseled: Patient, Verbalized understanding, exercise and weight loss.    Orders     Orders (Selected)   Outpatient Orders  Ordered  Return to Clinic (Request): RFV: Annual Px, Return in 12 months after lab work  Return to Office (Request): RFV: Colonoscopy in 5 years  Return to Office (Request): RFV: Lipids, BMP and PSA annually  Prescriptions  Prescribed  atorvastatin 20 mg oral tablet: See Instructions, Instructions: TAKE ONE TABLET BY MOUTH ONE TIME DAILY, # 90 tab(s), 3 Refill(s), Type: Soft Stop, Pharmacy: [facility] #[id number], TAKE ONE TABLET BY MOUTH ONE TIME DAILY  hydroCHLOROthiazide-lisinopril 12.5 mg-20 mg oral tablet: 2 tab(s), PO, Daily, # 180 tab(s), 3 Refill(s), Type: Maintenance, Pharmacy: [facility] #[id number], 2 tab(s)  po daily,x90 day(s).

## 2022-02-15 NOTE — NURSING NOTE
Comprehensive Intake Entered On:  1/4/2019 3:12 PM CST    Performed On:  1/4/2019 3:10 PM CST by Nikky Pérez MA               Summary   Chief Complaint :   bp check   Weight Measured :   208 lb(Converted to: 208 lb 0 oz, 94.35 kg)    Height Measured :   71 in(Converted to: 5 ft 11 in, 180.34 cm)    Body Mass Index :   29.01 kg/m2 (HI)    Body Surface Area :   2.17 m2   Systolic Blood Pressure :   150 mmHg (HI)    Diastolic Blood Pressure :   74 mmHg   Mean Arterial Pressure :   99 mmHg   Peripheral Pulse Rate :   86 bpm   BP Site :   Right arm   Nikky Pérez MA - 1/4/2019 3:10 PM CST   Health Status   Allergies Verified? :   Yes   Medication History Verified? :   Yes   Nikky Pérez MA - 1/4/2019 3:10 PM CST   Meds / Allergies   (As Of: 1/4/2019 3:12:05 PM CST)   Allergies (Active)   brimonidine ophthalmic  Estimated Onset Date:   Unspecified ; Reactions:   rash ; Created By:   Alexi Troy MD; Reaction Status:   Active ; Category:   Drug ; Substance:   brimonidine ophthalmic ; Type:   Allergy ; Updated By:   Alexi Troy MD; Reviewed Date:   2/16/2016 2:10 PM CST        Medication List   (As Of: 1/4/2019 3:12:05 PM CST)   Prescription/Discharge Order    atorvastatin  :   atorvastatin ; Status:   Prescribed ; Ordered As Mnemonic:   atorvastatin 20 mg oral tablet ; Simple Display Line:   20 mg, 1 tab(s), po, daily, 90 tab(s), 3 Refill(s) ; Ordering Provider:   Alexi Troy MD; Catalog Code:   atorvastatin ; Order Dt/Tm:   5/4/2018 9:27:24 AM          fluticasone nasal  :   fluticasone nasal ; Status:   Prescribed ; Ordered As Mnemonic:   Flonase 50 mcg/inh nasal spray ; Simple Display Line:   1 spray(s), nasal, daily, 3 EA, 3 Refill(s) ; Ordering Provider:   Alexi Troy MD; Catalog Code:   fluticasone nasal ; Order Dt/Tm:   5/4/2018 9:27:41 AM          hydroCHLOROthiazide  :   hydroCHLOROthiazide ; Status:   Prescribed ; Ordered As Mnemonic:   hydroCHLOROthiazide 12.5 mg oral capsule ; Simple Display  Line:   12.5 mg, 1 cap(s), PO, Daily, 90 cap(s), 3 Refill(s) ; Ordering Provider:   Alexi Troy MD; Catalog Code:   hydroCHLOROthiazide ; Order Dt/Tm:   5/4/2018 9:29:18 AM          lisinopril  :   lisinopril ; Status:   Prescribed ; Ordered As Mnemonic:   lisinopril 40 mg oral tablet ; Simple Display Line:   40 mg, 1 tab(s), PO, Daily, 90 tab(s), 3 Refill(s) ; Ordering Provider:   Alexi Troy MD; Catalog Code:   lisinopril ; Order Dt/Tm:   5/4/2018 9:28:30 AM            Home Meds    bimatoprost ophthalmic  :   bimatoprost ophthalmic ; Status:   Documented ; Ordered As Mnemonic:   Lumigan ; Simple Display Line:   both eyes, qpm, 0 Refill(s) ; Catalog Code:   bimatoprost ophthalmic ; Order Dt/Tm:   2/16/2016 1:49:11 PM          brinzolamide ophthalmic  :   brinzolamide ophthalmic ; Status:   Documented ; Ordered As Mnemonic:   Azopt 1% ophthalmic suspension ; Simple Display Line:   1 drop(s), both eyes, tid, 0 Refill(s) ; Catalog Code:   brinzolamide ophthalmic ; Order Dt/Tm:   4/6/2017 1:50:04 PM
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
## 2022-02-15 NOTE — TELEPHONE ENCOUNTER
---------------------  From: Bri Ochoa CMA (Phone Messages Pool ([id number]))   To: [employer] Message Pool ([id number]);     Sent: 5/4/2020 8:24:14 AM CDT  Subject: FW: For Dr. Troy     Telemed appt? Fill another 3 months? Please advise.      ---------------------  From: SHANE MCKEON  To: [facility]  Sent: 05/04/2020 08:13 a.m. CDT  Subject: For Dr. Troy  The refills on prescriptions for Lisinopril, Atorvastatin and Hydrochlorot (sic) are nearly depleted (90 day refills). I have about 10 days of supply. Do you want me to come in for an annual physical or to postpone until the present situation calms down more. I have no new obvious conditions to warrant unnecessary exposure. My pharmacy is [facility]. Thanks.---------------------  From: Toña Muhammad ([employer] Message Pool ([id number]))   To: Alexi Troy MD;     Sent: 5/4/2020 9:32:26 AM CDT  Subject: FW: For Dr. Troy---------------------  From: Alexi Troy MD   To: [employer] Message Pool ([id number]);     Sent: 5/4/2020 10:04:06 AM CDT  Subject: RE: For Dr. Troy     fill for 3 months. with physical then.---------------------  From: Toña Muhammad ([employer] Message Pool ([facility]24[facility]))   To: SHANE MCKEON    Sent: 5/4/2020 10:35:07 AM CDT  Subject: RE: For Dr. Woodrow Charles,    We will fill your meds for 3 months and then after that you will need to be seen for your annual visit and medications refills. Medications have been sent to the pharmacy. Take care!      Toña Telleztient notified and RTC placed.

## 2022-02-15 NOTE — PROGRESS NOTES
"   Patient:   SHANE MCKEON            MRN: [id number]            FIN: [id number]               Age:   69 years     Sex:  Male     :  1950   Associated Diagnoses:   Changing skin lesion   Author:   Reji Redd PA-C      Chief Complaint   10/22/2019 1:39 PM CDT   Pt here for lump on Right forearm that he has had \"for awhile\"      History of Present Illness   Chief complaint and symptoms noted above and confirmed with patient   raised skin lesion on right forearm has been present for a few months, he gets it caught on things and it will bleed         Health Status   Allergies:    Allergic Reactions (All)  Severity Not Documented  Brimonidine ophthalmic (Rash)  Canceled/Inactive Reactions (All)  No known allergies   Medications:  (Selected)   Prescriptions  Prescribed  Flonase 50 mcg/inh nasal spray: = 1 spray(s), nasal, daily, # 3 EA, 3 Refill(s), Type: Maintenance  atorvastatin 20 mg oral tablet: = 1 tab(s) ( 20 mg ), po, daily, # 90 tab(s), 3 Refill(s), Type: Maintenance, pt is due for annual visit and labs  hydroCHLOROthiazide 12.5 mg oral capsule: = 1 cap(s) ( 12.5 mg ), PO, Daily, # 90 cap(s), 3 Refill(s), Type: Maintenance  lisinopril 40 mg oral tablet: = 1 tab(s) ( 40 mg ), PO, Daily, # 90 tab(s), 3 Refill(s), Type: Maintenance   Problem list:    All Problems (Selected)  Hearing disorder / SNOMED CT 8834661047 / Confirmed  Chronic rhinitis / SNOMED CT 695254930 / Confirmed  GERD (gastroesophageal reflux disease) / SNOMED CT 1192275014 / Confirmed  H/O adenomatous polyp of colon / SNOMED CT 8430209961 / Confirmed  Hyperlipidemia / SNOMED CT 254586737 / Confirmed  Glaucoma / SNOMED CT 79307863 / Confirmed  Chronic low back pain / SNOMED CT 931650130 / Confirmed  HTN, goal below 140/90 / SNOMED CT 08907953 / Confirmed      Histories   Past Medical History:    Active  HTN, goal below 140/90 (29941766): Onset on 2013 at 62 years.  Chronic rhinitis (464849831): Onset on 2010 at 59 " years.  Comments:  2010 CDT 3:12 PM CDT -     Chronic low back pain (338641655): Onset on 2010 at 59 years.  Comments:  2010 CDT 3:31 PM CDT -     Glaucoma (68645283)  GERD (gastroesophageal reflux disease) (7935038286)  Hearing disorder (6266901825)   Family History:    Hypertension  Mother ()  Parkinson's disease  Father  CVA - Cerebrovascular accident  Mother ()  Miscellaneous  Mother ()  Comments:  2010 10:31 AM CDT - Viry Ceballos CMA  Chronic boughts of diarrhea     Procedure history:    Colonoscopy (589328168) on 3/18/2015 at 64 Years.  Comments:  3/19/2015 5:43 PM CDT - Alexi Troy MD  Indication: Screening  Sedation: 150 mcg Fentanyl, 3 mg Versed  Findings: 2 Adenomatous polyps  Repeat in 5 years.  Flexible sigmoidoscopy (67942673) on 2008 at 58 Years.  Comments:  2013 8:20 AM CDT - Alexi Troy MD  Normal. Repeat in 5 years.  Spermatocelectomy (10812942) in  at 51 Years.  Comments:  2011 10:17 AM CDT - Emma Mariano  Left side.    2011 10:16 AM CDT - Emma Mariano  And hydrocelectomy.  Compression fracture (829.0).  Comments:  2011 10:31 AM CDT - Emma Mariano  T7, due to fall.      Physical Examination   Vital Signs   10/22/2019 1:39 PM CDT Temperature Tympanic 97.8 DegF  LOW    Peripheral Pulse Rate 60 bpm    Pulse Site Radial artery    Respiratory Rate 16 br/min    Systolic Blood Pressure 126 mmHg    Diastolic Blood Pressure 76 mmHg    Mean Arterial Pressure 93 mmHg    BP Site Right arm      Measurements from flowsheet : Measurements   10/22/2019 1:39 PM CDT Height Measured - Standard 71 in    Weight Measured - Standard 206 lb    BSA 2.16 m2    Body Mass Index 28.73 kg/m2  HI      General:  No acute distress.    Integumentary:  8 mm raised scabbed lesion on right forearm, almost completely detached.       Review / Management   Course:  skin is cleaned, anesthetized with lidocaine with epi, 15 blade is used to make  elliptical excision of lesion, wound is closed with simple sutures of 5-0 Ethilon,  wound is recleaned and bandage.  Lesion is sent for pathology. Wound care instruction sheet is given, return in 7 days for suture removal..       Impression and Plan   Diagnosis     Changing skin lesion (BCN72-BU L98.9).     Summary:  keep wound clean and dry, sutures to be removed in 7 days.    Orders     Orders   Lab (Gen Lab  Reference Lab):  Tissue pathology* ([facility]) (Order): Specimen Type: Miscellaneous Specimen, Collection Date: 10/22/2019 1:59 PM CDT.     Orders   Charges:  [id number] unlisted px skin muc membrane +subq tissue ([facility]) (Order): Quantity: 1, Changing skin lesion.

## 2022-02-15 NOTE — TELEPHONE ENCOUNTER
Entered by Rebecca Jules LPN on August 13, 2021 10:04:31 AM CDT  ---------------------  From: Rebecca Jules LPN   To: [facility]    Sent: 8/13/2021 10:04:31 AM CDT  Subject: Medication Management     ** Submitted: **  Order:lisinopril (lisinopril 40 mg oral tablet)  1 tab(s)  Oral  daily  Qty:  30 tab(s)        Refills:  0          Substitutions Allowed     Route To Pharmacy - [facility]    Signed by Rebecca Jules LPN  8/13/2021 3:04:00 PM [facility]    ** Submitted: **  Complete:lisinopril (lisinopril 40 mg oral tablet)   Signed by Rebecca Jules LPN  8/13/2021 3:04:00 PM [facility]    ** Not Approved:  **  lisinopril (LISINOPRIL 40MG TABLET)  TAKE 1 TABLET BY MOUTH DAILY  Qty:  90 EA        Days Supply:  90        Refills:  0          Substitutions Allowed     Route To Pharmacy - [facility]   Signed by Rebecca Jules LPN            ** Submitted: **  Order:hydroCHLOROthiazide (hydroCHLOROthiazide 12.5 mg oral capsule)  1 cap(s)  Oral  daily  Qty:  30 tab(s)        Refills:  0          Substitutions Allowed     Route To Pharmacy - [facility]    Signed by Rebecca Jules LPN  8/13/2021 3:04:00 PM [facility]    ** Submitted: **  Complete:hydroCHLOROthiazide (hydroCHLOROthiazide 12.5 mg oral capsule)   Signed by Rebecca Jules LPN  8/13/2021 3:04:00 PM [facility]    ** Not Approved:  **  hydroCHLOROthiazide (HYDROCHLOROTHIAZIDE 12.5MG CAPSULE)  TAKE 1 CAPSULE BY MOUTH DAILY  Qty:  90 EA        Days Supply:  90        Refills:  0          Substitutions Allowed     Route To Pharmacy - [facility]   Signed by Rebecca Jules LPN            ** Submitted: **  Order:atorvastatin (atorvastatin 20 mg oral tablet)  1 tab(s)  Oral  daily  Qty:  30 tab(s)        Refills:  0          Substitutions Allowed     Route To Pharmacy - [facility]    Signed by Rebecca Jules LPN  8/13/2021 3:03:00 PM [facility]    ** Submitted: **  Complete:atorvastatin (atorvastatin 20 mg oral tablet)   Signed by Rebecca Jules LPN  8/13/2021 3:03:00 PM [facility]    ** Not Approved:  **  atorvastatin  (ATORVASTATIN CALCIUM 20MG TABLET)  TAKE 1 TABLET BY MOUTH DAILY  Qty:  90 EA        Days Supply:  90        Refills:  0          Substitutions Allowed     Route To Pharmacy - [facility]   Signed by Rebecca Jules LPN          Med Refill      Date of last office visit and reason:  8/13/20 Annual Visit      Date of last Med Check / Px:   8/13/20  Date of last labs pertaining to med:  8/13/20    RTC order in chart:  yes- pt scheduled for labs 8/19 and appt 8/26    For Protocol refill, has patient been contacted:  filled per 30 day protocol        ------------------------------------------  From: [employer]  To: Alexi Troy MD  Sent: August 11, 2021 11:02:53 AM CDT  Subject: Medication Management  Due: August 12, 2021 12:22:42 AM CDT     ** On Hold Pending Signature **     Drug: lisinopril (lisinopril 40 mg oral tablet), TAKE 1 TABLET BY MOUTH DAILY  Quantity: 90 EA  Days Supply: 90  Refills: 0  Substitutions Allowed  Notes from Pharmacy:     Dispensed Drug: lisinopril (lisinopril 40 mg oral tablet), TAKE 1 TABLET BY MOUTH DAILY  Quantity: 90 EA  Days Supply: 90  Refills: 0  Substitutions Allowed  Notes from Pharmacy:     ** On Hold Pending Signature **     Drug: hydroCHLOROthiazide (hydroCHLOROthiazide 12.5 mg oral capsule), TAKE 1 CAPSULE BY MOUTH DAILY  Quantity: 90 EA  Days Supply: 90  Refills: 0  Substitutions Allowed  Notes from Pharmacy:     Dispensed Drug: hydroCHLOROthiazide (hydroCHLOROthiazide 12.5 mg oral capsule), TAKE 1 CAPSULE BY MOUTH DAILY  Quantity: 90 EA  Days Supply: 90  Refills: 0  Substitutions Allowed  Notes from Pharmacy:     ** On Hold Pending Signature **     Drug: atorvastatin (atorvastatin 20 mg oral tablet), TAKE 1 TABLET BY MOUTH DAILY  Quantity: 90 EA  Days Supply: 90  Refills: 0  Substitutions Allowed  Notes from Pharmacy:     Dispensed Drug: atorvastatin (atorvastatin 20 mg oral tablet), TAKE 1 TABLET BY MOUTH DAILY  Quantity: 90 EA  Days Supply: 90  Refills: 0  Substitutions  Allowed  Notes from Pharmacy:  ------------------------------------------

## 2022-02-15 NOTE — NURSING NOTE
Quick Intake Entered On:  1/4/2019 3:21 PM CST    Performed On:  1/4/2019 3:21 PM CST by Alexi Troy MD               Summary   Systolic Blood Pressure :   126 mmHg   Diastolic Blood Pressure :   72 mmHg   Mean Arterial Pressure :   90 mmHg   BP Site :   Right arm   BP Method :   Manual   Alexi Troy MD - 1/4/2019 3:21 PM CST

## 2022-02-15 NOTE — TELEPHONE ENCOUNTER
---------------------  From: Olinda Acharya CMA (Phone Messages Pool ([address] [place] [address]))   To: [facility] Message Pool ([address] [place] [address]);     Sent: 7/22/2020 9:30:18 AM CDT  Subject: FW: Re-scheduled appt.           ---------------------  From: SHANE EVANS  To: [facility]  Sent: 07/22/2020 09:11 a.m. CDT  Subject: Re-scheduled appt.  Three months ago, we decided to postpone my annual appt.with Dr. Troy, and I received sufficient prescriptions to carry me to this point. I have no new ailments or conditions to check on. How do you want to proceed from here? Thanks, Armando Evans---------------------  From: Toña Muhammad ([employer] Message Pool ([address] [place] [address]))   To: Alexi Troy MD;     Sent: 7/22/2020 1:04:26 PM CDT  Subject: FW: Re-scheduled appt.---------------------  From: Alexi Troy MD   To: [employer] Message Pool ([address] [place] [address]);     Sent: 7/22/2020 1:08:33 PM CDT  Subject: RE: Re-scheduled appt.     Wellness visit---------------------  From: Toña Muhammad ([employer] Message Pool ([address] [place] [address]))   To: SHANE EVANS    Sent: 7/22/2020 1:19:51 PM CDT  Subject: FW: Re-scheduled appt.     Blade Charles,    You will need a Wellness visit with [facility]. Please call us to schedule at [phone].    Toña

## 2022-02-15 NOTE — NURSING NOTE
Quick Intake Entered On:  10/7/2021 9:00 AM CDT    Performed On:  10/7/2021 9:00 AM CDT by Diana Castro RN               Summary   Chief Complaint :   BPO check   Advance Directive :   No   Systolic Blood Pressure :   137 mmHg (HI)    Diastolic Blood Pressure :   74 mmHg   Mean Arterial Pressure :   95 mmHg   Peripheral Pulse Rate :   75 bpm   Diana Castro RN - 10/7/2021 9:00 AM CDT

## 2022-03-02 VITALS
DIASTOLIC BLOOD PRESSURE: 78 MMHG | BODY MASS INDEX: 30.1 KG/M2 | TEMPERATURE: 98 F | WEIGHT: 215 LBS | HEART RATE: 80 BPM | SYSTOLIC BLOOD PRESSURE: 114 MMHG | HEIGHT: 71 IN | RESPIRATION RATE: 16 BRPM
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
## 2022-06-16 NOTE — PROCEDURES
Accession Number:       [id number]  PATHOLOGIST:     See comment       Wilberto Coley Jr., M.D., Board Certified in Anatomic       Pathology, Clinical Pathology and Cytopathology,       1 [phone]   (electronic signature)  A SOURCE:     Skin, right forearm, excision  A GROSS DESCRIPTION:     See comment       Specimen is received in formalin, labeled with       multiple patient identifier(s) and consists of       one piece(s) of skin measuring 0.9 x 0.7 x 0.1       cm, irregular in shape and tan-gray in color. The       margins are inked green. The specimen is       trisected and entirely submitted in one       cassette(s).Upon trisecting the piece became into       four pieces. All tissue are entirely submitted in       one cassette.         Gross exam(s) performed at: [address] [id number]         : IGGY OROZCO MD  A DIAGNOSIS:     Superficial biopsy showing focal atypical squamous proliferation extending to the deep margins. See comment.  A COMMENT:     See comment         The findings are suspicious for squamous cell       carcinoma.         The base of the lesion is not identified.         Complete excision is recommended.

## 2022-08-24 ENCOUNTER — DOCUMENTATION ONLY (OUTPATIENT)
Dept: LAB | Facility: CLINIC | Age: 72
End: 2022-08-24

## 2022-08-24 DIAGNOSIS — Z12.5 SCREENING FOR PROSTATE CANCER: ICD-10-CM

## 2022-08-24 DIAGNOSIS — E78.5 DYSLIPIDEMIA: ICD-10-CM

## 2022-08-24 DIAGNOSIS — I10 PRIMARY HYPERTENSION: Primary | ICD-10-CM

## 2022-08-25 PROBLEM — K21.9 GASTROESOPHAGEAL REFLUX DISEASE: Status: ACTIVE | Noted: 2022-08-25

## 2022-08-25 PROBLEM — E78.2 MIXED HYPERLIPIDEMIA: Status: ACTIVE | Noted: 2022-08-25

## 2022-08-25 PROBLEM — H40.9 GLAUCOMA: Status: ACTIVE | Noted: 2022-08-25

## 2022-08-25 PROBLEM — C44.621 SQUAMOUS CELL CARCINOMA OF UPPER EXTREMITY: Status: ACTIVE | Noted: 2022-08-25

## 2022-08-25 PROBLEM — Z86.0101 HISTORY OF ADENOMATOUS POLYP OF COLON: Status: ACTIVE | Noted: 2022-08-25

## 2022-08-25 PROBLEM — H91.90 HEARING DISORDER: Status: ACTIVE | Noted: 2022-08-25

## 2022-08-25 RX ORDER — CETIRIZINE HYDROCHLORIDE 10 MG/1
10 TABLET ORAL
COMMUNITY
Start: 2022-01-11

## 2022-08-25 RX ORDER — LISINOPRIL 40 MG/1
40 TABLET ORAL DAILY
COMMUNITY
Start: 2021-08-26 | End: 2022-09-13

## 2022-08-25 RX ORDER — ATORVASTATIN CALCIUM 20 MG/1
20 TABLET, FILM COATED ORAL DAILY
COMMUNITY
Start: 2021-08-26 | End: 2022-09-13

## 2022-08-29 ENCOUNTER — LAB (OUTPATIENT)
Dept: LAB | Facility: CLINIC | Age: 72
End: 2022-08-29
Payer: COMMERCIAL

## 2022-08-29 DIAGNOSIS — Z12.5 SCREENING FOR PROSTATE CANCER: ICD-10-CM

## 2022-08-29 DIAGNOSIS — Z11.59 NEED FOR HEPATITIS C SCREENING TEST: Primary | ICD-10-CM

## 2022-08-29 DIAGNOSIS — I10 PRIMARY HYPERTENSION: ICD-10-CM

## 2022-08-29 DIAGNOSIS — E78.5 DYSLIPIDEMIA: ICD-10-CM

## 2022-08-29 LAB
ANION GAP SERPL CALCULATED.3IONS-SCNC: 12 MMOL/L (ref 7–15)
BUN SERPL-MCNC: 13.8 MG/DL (ref 8–23)
CALCIUM SERPL-MCNC: 9.2 MG/DL (ref 8.8–10.2)
CHLORIDE SERPL-SCNC: 103 MMOL/L (ref 98–107)
CHOLEST SERPL-MCNC: 158 MG/DL
CREAT SERPL-MCNC: 0.99 MG/DL (ref 0.67–1.17)
DEPRECATED HCO3 PLAS-SCNC: 26 MMOL/L (ref 22–29)
GFR SERPL CREATININE-BSD FRML MDRD: 82 ML/MIN/1.73M2
GLUCOSE SERPL-MCNC: 89 MG/DL (ref 70–99)
HCV AB SERPL QL IA: NONREACTIVE
HDLC SERPL-MCNC: 40 MG/DL
LDLC SERPL CALC-MCNC: 87 MG/DL
NONHDLC SERPL-MCNC: 118 MG/DL
POTASSIUM SERPL-SCNC: 4.4 MMOL/L (ref 3.4–5.3)
PSA SERPL-MCNC: 0.66 NG/ML (ref 0–6.5)
SODIUM SERPL-SCNC: 141 MMOL/L (ref 136–145)
TRIGL SERPL-MCNC: 153 MG/DL

## 2022-08-29 PROCEDURE — 80061 LIPID PANEL: CPT

## 2022-08-29 PROCEDURE — G0472 HEP C SCREEN HIGH RISK/OTHER: HCPCS

## 2022-08-29 PROCEDURE — G0103 PSA SCREENING: HCPCS

## 2022-08-29 PROCEDURE — 36415 COLL VENOUS BLD VENIPUNCTURE: CPT

## 2022-08-29 PROCEDURE — 80048 BASIC METABOLIC PNL TOTAL CA: CPT

## 2022-09-12 RX ORDER — HYDROCHLOROTHIAZIDE 12.5 MG/1
12.5 CAPSULE ORAL EVERY MORNING
COMMUNITY
Start: 2022-06-16 | End: 2022-09-13

## 2022-09-13 ENCOUNTER — OFFICE VISIT (OUTPATIENT)
Dept: FAMILY MEDICINE | Facility: CLINIC | Age: 72
End: 2022-09-13
Payer: COMMERCIAL

## 2022-09-13 VITALS
WEIGHT: 212.6 LBS | DIASTOLIC BLOOD PRESSURE: 64 MMHG | BODY MASS INDEX: 29.76 KG/M2 | HEIGHT: 71 IN | TEMPERATURE: 97 F | SYSTOLIC BLOOD PRESSURE: 122 MMHG | HEART RATE: 80 BPM

## 2022-09-13 DIAGNOSIS — Z86.0101 HISTORY OF ADENOMATOUS POLYP OF COLON: ICD-10-CM

## 2022-09-13 DIAGNOSIS — I10 PRIMARY HYPERTENSION: ICD-10-CM

## 2022-09-13 DIAGNOSIS — M79.644 PAIN OF FINGER OF RIGHT HAND: ICD-10-CM

## 2022-09-13 DIAGNOSIS — E78.2 MIXED HYPERLIPIDEMIA: ICD-10-CM

## 2022-09-13 DIAGNOSIS — Z00.00 ENCOUNTER FOR MEDICARE ANNUAL WELLNESS EXAM: Primary | ICD-10-CM

## 2022-09-13 DIAGNOSIS — Z13.6 ENCOUNTER FOR ABDOMINAL AORTIC ANEURYSM (AAA) SCREENING: ICD-10-CM

## 2022-09-13 PROCEDURE — G0439 PPPS, SUBSEQ VISIT: HCPCS | Performed by: INTERNAL MEDICINE

## 2022-09-13 PROCEDURE — 99213 OFFICE O/P EST LOW 20 MIN: CPT | Mod: 25 | Performed by: INTERNAL MEDICINE

## 2022-09-13 PROCEDURE — 90662 IIV NO PRSV INCREASED AG IM: CPT | Performed by: INTERNAL MEDICINE

## 2022-09-13 PROCEDURE — G0008 ADMIN INFLUENZA VIRUS VAC: HCPCS | Performed by: INTERNAL MEDICINE

## 2022-09-13 RX ORDER — ATORVASTATIN CALCIUM 20 MG/1
20 TABLET, FILM COATED ORAL DAILY
Qty: 90 TABLET | Refills: 3 | Status: SHIPPED | OUTPATIENT
Start: 2022-09-13 | End: 2022-12-12

## 2022-09-13 RX ORDER — HYDROCHLOROTHIAZIDE 12.5 MG/1
12.5 CAPSULE ORAL EVERY MORNING
Qty: 90 CAPSULE | Refills: 3 | Status: SHIPPED | OUTPATIENT
Start: 2022-09-13 | End: 2022-12-12

## 2022-09-13 RX ORDER — LISINOPRIL 40 MG/1
40 TABLET ORAL DAILY
Qty: 90 TABLET | Refills: 3 | Status: SHIPPED | OUTPATIENT
Start: 2022-09-13 | End: 2022-12-12

## 2022-09-13 ASSESSMENT — ENCOUNTER SYMPTOMS
NAUSEA: 0
ARTHRALGIAS: 1
PALPITATIONS: 0
PARESTHESIAS: 0
SORE THROAT: 0
FREQUENCY: 0
FEVER: 0
SHORTNESS OF BREATH: 0
HEMATURIA: 0
HEARTBURN: 0
HEMATOCHEZIA: 0
DIARRHEA: 0
DYSURIA: 0
HEADACHES: 0
EYE PAIN: 0
WEAKNESS: 0
NERVOUS/ANXIOUS: 0
ABDOMINAL PAIN: 0
JOINT SWELLING: 0
COUGH: 0
CHILLS: 0
DIZZINESS: 0
MYALGIAS: 0
CONSTIPATION: 0

## 2022-09-13 ASSESSMENT — ACTIVITIES OF DAILY LIVING (ADL): CURRENT_FUNCTION: NO ASSISTANCE NEEDED
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
## 2022-09-13 NOTE — PATIENT INSTRUCTIONS
Patient Education   Personalized Prevention Plan  You are due for the preventive services outlined below.  Your care team is available to assist you in scheduling these services.  If you have already completed any of these items, please share that information with your care team to update in your medical record.  Health Maintenance Due   Topic Date Due     Discuss Advance Care Planning  Never done     AORTIC ANEURYSM SCREENING (SYSTEM ASSIGNED)  Never done     Annual Wellness Visit  08/13/2021     COVID-19 Vaccine (3 - Booster for Moderna series) 03/07/2022     Flu Vaccine (1) 09/01/2022

## 2023-11-26 ENCOUNTER — HEALTH MAINTENANCE LETTER (OUTPATIENT)
Age: 73
End: 2023-11-26

## 2024-02-07 ENCOUNTER — PATIENT OUTREACH (OUTPATIENT)
Dept: GASTROENTEROLOGY | Facility: CLINIC | Age: 74
End: 2024-02-07

## 2024-02-07 DIAGNOSIS — Z12.11 SPECIAL SCREENING FOR MALIGNANT NEOPLASMS, COLON: Primary | ICD-10-CM

## 2024-02-07 NOTE — PROGRESS NOTES
"CRC Screening Colonoscopy Referral Review    Patient meets the inclusion criteria for screening colonoscopy standing order.    Ordering/Referring Provider:  Alexi Troy     BMI: Estimated body mass index is 29.65 kg/m  as calculated from the following:    Height as of 9/13/22: 1.803 m (5' 11\").    Weight as of 9/13/22: 96.4 kg (212 lb 9.6 oz).     Sedation:  Does patient have any of the following conditions affecting sedation?  No medical conditions affecting sedation.    Previous Scopes:  Any previous recommendations or follow up needs based on previous scope?  na / No recommendations.    Medical Concerns to Postpone Order:  Does patient have any of the following medical concerns that should postpone/delay colonoscopy referral?  No medical conditions affecting colonoscopy referral.    Final Referral Details:  Based on patient's medical history patient is appropriate for referral order with moderate sedation. If patient's BMI > 50 do not schedule in ASC.  "

## 2024-05-20 ENCOUNTER — TELEPHONE (OUTPATIENT)
Dept: FAMILY MEDICINE | Facility: CLINIC | Age: 74
End: 2024-05-20

## 2024-05-20 NOTE — TELEPHONE ENCOUNTER
Patient Quality Outreach    Patient is due for the following:   Physical Annual Wellness Visit      Type of outreach:    Phone, left message for patient/parent to call back.      Questions for provider review:    None           Vandana Ramírez

## 2024-07-02 ENCOUNTER — PATIENT OUTREACH (OUTPATIENT)
Dept: FAMILY MEDICINE | Facility: CLINIC | Age: 74
End: 2024-07-02

## 2024-07-02 NOTE — TELEPHONE ENCOUNTER
Patient Quality Outreach    Patient is due for the following:   Physical Annual Wellness Visit    Next Steps:   Schedule a Annual Wellness Visit    Type of outreach:    Phone, left message for patient/parent to call back.      Questions for provider review:    None           MANDIE KIM

## 2025-02-03 ENCOUNTER — PATIENT OUTREACH (OUTPATIENT)
Dept: GASTROENTEROLOGY | Facility: CLINIC | Age: 75
End: 2025-02-03

## 2025-02-03 DIAGNOSIS — Z12.11 SPECIAL SCREENING FOR MALIGNANT NEOPLASMS, COLON: Primary | ICD-10-CM

## 2025-03-02 ENCOUNTER — HEALTH MAINTENANCE LETTER (OUTPATIENT)
Age: 75
End: 2025-03-02

## 2025-04-03 ENCOUNTER — MYC MEDICAL ADVICE (OUTPATIENT)
Dept: FAMILY MEDICINE | Facility: CLINIC | Age: 75
End: 2025-04-03